# Patient Record
Sex: FEMALE | Race: WHITE | Employment: OTHER | URBAN - METROPOLITAN AREA
[De-identification: names, ages, dates, MRNs, and addresses within clinical notes are randomized per-mention and may not be internally consistent; named-entity substitution may affect disease eponyms.]

---

## 2019-05-20 ENCOUNTER — HOSPITAL ENCOUNTER (OUTPATIENT)
Age: 84
Setting detail: OBSERVATION
Discharge: HOME | End: 2019-05-21
Attending: EMERGENCY MEDICINE | Admitting: INTERNAL MEDICINE
Payer: MEDICARE

## 2019-05-20 ENCOUNTER — APPOINTMENT (OUTPATIENT)
Dept: GENERAL RADIOLOGY | Age: 84
End: 2019-05-20
Payer: MEDICARE

## 2019-05-20 DIAGNOSIS — R07.9 CHEST PAIN, UNSPECIFIED TYPE: Primary | ICD-10-CM

## 2019-05-20 LAB
ALBUMIN SERPL-MCNC: 4.1 G/DL (ref 3.5–5.2)
ALP BLD-CCNC: 35 U/L (ref 35–104)
ALT SERPL-CCNC: 13 U/L (ref 0–32)
ANION GAP SERPL CALCULATED.3IONS-SCNC: 9 MMOL/L (ref 7–16)
AST SERPL-CCNC: 14 U/L (ref 0–31)
BILIRUB SERPL-MCNC: 0.5 MG/DL (ref 0–1.2)
BUN BLDV-MCNC: 14 MG/DL (ref 8–23)
CALCIUM SERPL-MCNC: 8.9 MG/DL (ref 8.6–10.2)
CHLORIDE BLD-SCNC: 108 MMOL/L (ref 98–107)
CK MB: 2.5 NG/ML (ref 0–4.3)
CO2: 29 MMOL/L (ref 22–29)
CREAT SERPL-MCNC: 0.8 MG/DL (ref 0.5–1)
GFR AFRICAN AMERICAN: >60
GFR NON-AFRICAN AMERICAN: >60 ML/MIN/1.73
GLUCOSE BLD-MCNC: 108 MG/DL (ref 74–99)
HCT VFR BLD CALC: 37.6 % (ref 34–48)
HEMOGLOBIN: 12.5 G/DL (ref 11.5–15.5)
MCH RBC QN AUTO: 31.2 PG (ref 26–35)
MCHC RBC AUTO-ENTMCNC: 33.2 % (ref 32–34.5)
MCV RBC AUTO: 93.8 FL (ref 80–99.9)
PDW BLD-RTO: 12.5 FL (ref 11.5–15)
PLATELET # BLD: 173 E9/L (ref 130–450)
PMV BLD AUTO: 9.8 FL (ref 7–12)
POTASSIUM SERPL-SCNC: 3.8 MMOL/L (ref 3.5–5)
RBC # BLD: 4.01 E12/L (ref 3.5–5.5)
SODIUM BLD-SCNC: 146 MMOL/L (ref 132–146)
TOTAL CK: 75 U/L (ref 20–180)
TOTAL PROTEIN: 6.3 G/DL (ref 6.4–8.3)
TROPONIN: <0.01 NG/ML (ref 0–0.03)
TROPONIN: <0.01 NG/ML (ref 0–0.03)
WBC # BLD: 6.1 E9/L (ref 4.5–11.5)

## 2019-05-20 PROCEDURE — 96372 THER/PROPH/DIAG INJ SC/IM: CPT

## 2019-05-20 PROCEDURE — 80053 COMPREHEN METABOLIC PANEL: CPT

## 2019-05-20 PROCEDURE — 36415 COLL VENOUS BLD VENIPUNCTURE: CPT

## 2019-05-20 PROCEDURE — G0378 HOSPITAL OBSERVATION PER HR: HCPCS

## 2019-05-20 PROCEDURE — 85027 COMPLETE CBC AUTOMATED: CPT

## 2019-05-20 PROCEDURE — 6370000000 HC RX 637 (ALT 250 FOR IP): Performed by: INTERNAL MEDICINE

## 2019-05-20 PROCEDURE — 71045 X-RAY EXAM CHEST 1 VIEW: CPT

## 2019-05-20 PROCEDURE — 6360000002 HC RX W HCPCS: Performed by: INTERNAL MEDICINE

## 2019-05-20 PROCEDURE — 99285 EMERGENCY DEPT VISIT HI MDM: CPT

## 2019-05-20 PROCEDURE — 93005 ELECTROCARDIOGRAM TRACING: CPT | Performed by: NURSE PRACTITIONER

## 2019-05-20 PROCEDURE — 82550 ASSAY OF CK (CPK): CPT

## 2019-05-20 PROCEDURE — 2580000003 HC RX 258: Performed by: INTERNAL MEDICINE

## 2019-05-20 PROCEDURE — 93010 ELECTROCARDIOGRAM REPORT: CPT | Performed by: INTERNAL MEDICINE

## 2019-05-20 PROCEDURE — 84484 ASSAY OF TROPONIN QUANT: CPT

## 2019-05-20 PROCEDURE — 82553 CREATINE MB FRACTION: CPT

## 2019-05-20 RX ORDER — ASPIRIN 81 MG/1
81 TABLET, CHEWABLE ORAL DAILY
Status: DISCONTINUED | OUTPATIENT
Start: 2019-05-21 | End: 2019-05-21 | Stop reason: HOSPADM

## 2019-05-20 RX ORDER — PANTOPRAZOLE SODIUM 40 MG/1
40 TABLET, DELAYED RELEASE ORAL DAILY
Status: DISCONTINUED | OUTPATIENT
Start: 2019-05-20 | End: 2019-05-21 | Stop reason: HOSPADM

## 2019-05-20 RX ORDER — GUAIFENESIN AND DEXTROMETHORPHAN HYDROBROMIDE 1200; 60 MG/1; MG/1
1 TABLET, EXTENDED RELEASE ORAL 2 TIMES DAILY
Status: DISCONTINUED | OUTPATIENT
Start: 2019-05-20 | End: 2019-05-20 | Stop reason: CLARIF

## 2019-05-20 RX ORDER — EZETIMIBE 10 MG/1
10 TABLET ORAL DAILY
Status: DISCONTINUED | OUTPATIENT
Start: 2019-05-20 | End: 2019-05-20 | Stop reason: CLARIF

## 2019-05-20 RX ORDER — SODIUM CHLORIDE 0.9 % (FLUSH) 0.9 %
10 SYRINGE (ML) INJECTION PRN
Status: DISCONTINUED | OUTPATIENT
Start: 2019-05-20 | End: 2019-05-21 | Stop reason: HOSPADM

## 2019-05-20 RX ORDER — NITROGLYCERIN 0.4 MG/1
0.4 TABLET SUBLINGUAL EVERY 5 MIN PRN
Status: DISCONTINUED | OUTPATIENT
Start: 2019-05-20 | End: 2019-05-21 | Stop reason: HOSPADM

## 2019-05-20 RX ORDER — LOSARTAN POTASSIUM 25 MG/1
25 TABLET ORAL DAILY
Status: DISCONTINUED | OUTPATIENT
Start: 2019-05-20 | End: 2019-05-21 | Stop reason: HOSPADM

## 2019-05-20 RX ORDER — ATORVASTATIN CALCIUM 10 MG/1
20 TABLET, FILM COATED ORAL DAILY
Status: DISCONTINUED | OUTPATIENT
Start: 2019-05-20 | End: 2019-05-21 | Stop reason: HOSPADM

## 2019-05-20 RX ORDER — ONDANSETRON 2 MG/ML
4 INJECTION INTRAMUSCULAR; INTRAVENOUS EVERY 6 HOURS PRN
Status: DISCONTINUED | OUTPATIENT
Start: 2019-05-20 | End: 2019-05-21 | Stop reason: HOSPADM

## 2019-05-20 RX ORDER — HYDROCHLOROTHIAZIDE 12.5 MG/1
12.5 TABLET ORAL DAILY
Status: DISCONTINUED | OUTPATIENT
Start: 2019-05-20 | End: 2019-05-21 | Stop reason: HOSPADM

## 2019-05-20 RX ORDER — AMLODIPINE BESYLATE 2.5 MG/1
2.5 TABLET ORAL DAILY
Status: DISCONTINUED | OUTPATIENT
Start: 2019-05-20 | End: 2019-05-21 | Stop reason: HOSPADM

## 2019-05-20 RX ORDER — SODIUM CHLORIDE 0.9 % (FLUSH) 0.9 %
10 SYRINGE (ML) INJECTION EVERY 12 HOURS SCHEDULED
Status: DISCONTINUED | OUTPATIENT
Start: 2019-05-20 | End: 2019-05-21 | Stop reason: HOSPADM

## 2019-05-20 RX ORDER — GUAIFENESIN AND DEXTROMETHORPHAN HYDROBROMIDE 400; 20 MG/1; MG/1
1 TABLET ORAL EVERY 4 HOURS PRN
Status: DISCONTINUED | OUTPATIENT
Start: 2019-05-20 | End: 2019-05-21 | Stop reason: HOSPADM

## 2019-05-20 RX ADMIN — ENOXAPARIN SODIUM 40 MG: 40 INJECTION SUBCUTANEOUS at 20:48

## 2019-05-20 RX ADMIN — PANTOPRAZOLE SODIUM 40 MG: 40 TABLET, DELAYED RELEASE ORAL at 18:20

## 2019-05-20 RX ADMIN — LOSARTAN POTASSIUM 25 MG: 25 TABLET, FILM COATED ORAL at 18:21

## 2019-05-20 RX ADMIN — Medication 10 ML: at 20:48

## 2019-05-20 RX ADMIN — HYDROCHLOROTHIAZIDE 12.5 MG: 12.5 TABLET ORAL at 18:21

## 2019-05-20 RX ADMIN — ATORVASTATIN CALCIUM 20 MG: 10 TABLET, FILM COATED ORAL at 18:20

## 2019-05-20 RX ADMIN — AMLODIPINE BESYLATE 2.5 MG: 2.5 TABLET ORAL at 18:21

## 2019-05-20 ASSESSMENT — HEART SCORE: ECG: 0

## 2019-05-20 NOTE — ED NOTES
ED SBAR faxed to . This nurse spoke with Adithya Valencia [place] [address] at [address]. Confirmation of ED SBAR receipt and pending transportation request is verbalized. Patient transportation to room 8207A requested.      Yonatan Ruiz RN  05/20/19 9513

## 2019-05-20 NOTE — ED NOTES
Bed: 40  Expected date:   Expected time:   Means of arrival:   Comments:  Toñito Hollingsworth, HARRIS  05/20/19 [id number]

## 2019-05-20 NOTE — ED PROVIDER NOTES
ED Attending  CC: Paige     Department of Emergency Medicine   ED  Provider Note  Admit Date/RoomTime: 5/20/2019 12:12 PM  ED Room: 40/40   Chief Complaint     Chest Pain (chest pain with sudden onset left sided radiating into left hand and back-dull. was given 1 ntg sl and 4 baby aspirin. )    History of Present Illness   Source of history provided by:  patient. History/Exam Limitations: none. Alejandra Colin is a 80 y.o. old female who has a past medical history of:   Past Medical History:   Diagnosis Date    GERD (gastroesophageal reflux disease)     Hyperlipidemia     Hypertension     presents to the emergency department by ambulance where the patient received see Ambulance Run Sheet, 324mg aspirin, 1SL nitro prior to arrival., with complaints of sudden onset left chest discomfort described as sharp beginning a few hour(s) prior to arrival.  The pain radiates to the left arm. Duration of symptoms: a few hours. Symptom(s) now is none. Her symptoms are associated with nothing. The symptoms are worsened by walking and relieved by nothing, she reports her pain had resolved by the time EMS gave nitro. There has been No shortness of breath, No orthopnea, No edema, No palpitations and No syncope associated with complaint. Her cardiac risk factors are heart score 3. ROS    Pertinent positives and negatives are stated within HPI, all other systems reviewed and are negative. Past Surgical History:   Procedure Laterality Date    APPENDECTOMY      CHOLECYSTECTOMY      COLONOSCOPY  9/12/13    HYSTERECTOMY     Social History:  reports that she has never smoked. She has never used smokeless tobacco. She reports that she drinks alcohol. She reports that she does not use drugs. Family History: family history is not on file. Allergies: Patient has no known allergies.     Physical Exam           ED Triage Vitals [05/20/19 1215]   BP Temp Temp Source Pulse Resp SpO2 Height Weight   (!) 157/64 98.2 °F None    Procedures:   none    MDM:  Patient is well appearing, afebrile. VS stable. Labs obtained, reassuring. No pain throughout ED stay. Plan is for admission for chest pain observation, case discussed with Dr. Saran Carlos agreeable to admit. Counseling:   I have spoken with the patient and discussed todays results, in addition to providing specific details for the plan of care and counseling regarding the diagnosis and prognosis and are agreeable with the plan. Assessment      1. Chest pain, unspecified type      This patient's ED course included: a personal history and physicial examination, re-evaluation prior to disposition, cardiac monitoring and continuous pulse oximetry  This patient has remained hemodynamically stable during their ED course. Plan   Admit to telemetry. Patient condition is good. New Medications     New Prescriptions    No medications on file     Electronically signed by LORNE Ramirez CNP   DD: 5/20/19  **This report was transcribed using voice recognition software. Every effort was made to ensure accuracy; however, inadvertent computerized transcription errors may be present.   END OF PROVIDER NOTE      LORNE Cobian CNP  05/20/19 7935

## 2019-05-21 ENCOUNTER — APPOINTMENT (OUTPATIENT)
Dept: NON INVASIVE DIAGNOSTICS | Age: 84
End: 2019-05-21
Payer: MEDICARE

## 2019-05-21 ENCOUNTER — APPOINTMENT (OUTPATIENT)
Dept: NUCLEAR MEDICINE | Age: 84
End: 2019-05-21
Payer: MEDICARE

## 2019-05-21 VITALS
DIASTOLIC BLOOD PRESSURE: 66 MMHG | SYSTOLIC BLOOD PRESSURE: 145 MMHG | HEIGHT: 63 IN | RESPIRATION RATE: 16 BRPM | HEART RATE: 80 BPM | TEMPERATURE: 97.6 F | OXYGEN SATURATION: 93 % | BODY MASS INDEX: 21.26 KG/M2 | WEIGHT: 120 LBS

## 2019-05-21 PROBLEM — I10 ESSENTIAL HYPERTENSION: Chronic | Status: ACTIVE | Noted: 2019-05-21

## 2019-05-21 PROBLEM — E78.5 HYPERLIPIDEMIA LDL GOAL <100: Chronic | Status: ACTIVE | Noted: 2019-05-21

## 2019-05-21 LAB
CHOLESTEROL, TOTAL: 171 MG/DL (ref 0–199)
CK MB: 2 NG/ML (ref 0–4.3)
EKG ATRIAL RATE: 65 BPM
EKG P AXIS: 59 DEGREES
EKG P-R INTERVAL: 156 MS
EKG Q-T INTERVAL: 396 MS
EKG QRS DURATION: 84 MS
EKG QTC CALCULATION (BAZETT): 411 MS
EKG R AXIS: 16 DEGREES
EKG T AXIS: 53 DEGREES
EKG VENTRICULAR RATE: 65 BPM
HCT VFR BLD CALC: 40.4 % (ref 34–48)
HDLC SERPL-MCNC: 66 MG/DL
HEMOGLOBIN: 13.8 G/DL (ref 11.5–15.5)
LDL CHOLESTEROL CALCULATED: 88 MG/DL (ref 0–99)
LV EF: 67 %
LVEF MODALITY: NORMAL
MCH RBC QN AUTO: 31.7 PG (ref 26–35)
MCHC RBC AUTO-ENTMCNC: 34.2 % (ref 32–34.5)
MCV RBC AUTO: 92.9 FL (ref 80–99.9)
PDW BLD-RTO: 12.6 FL (ref 11.5–15)
PLATELET # BLD: 174 E9/L (ref 130–450)
PMV BLD AUTO: 9.4 FL (ref 7–12)
RBC # BLD: 4.35 E12/L (ref 3.5–5.5)
TOTAL CK: 48 U/L (ref 20–180)
TRIGL SERPL-MCNC: 83 MG/DL (ref 0–149)
TROPONIN: <0.01 NG/ML (ref 0–0.03)
VLDLC SERPL CALC-MCNC: 17 MG/DL
WBC # BLD: 5.5 E9/L (ref 4.5–11.5)

## 2019-05-21 PROCEDURE — 6360000002 HC RX W HCPCS: Performed by: INTERNAL MEDICINE

## 2019-05-21 PROCEDURE — 93017 CV STRESS TEST TRACING ONLY: CPT

## 2019-05-21 PROCEDURE — G0378 HOSPITAL OBSERVATION PER HR: HCPCS

## 2019-05-21 PROCEDURE — 3430000000 HC RX DIAGNOSTIC RADIOPHARMACEUTICAL: Performed by: RADIOLOGY

## 2019-05-21 PROCEDURE — 84484 ASSAY OF TROPONIN QUANT: CPT

## 2019-05-21 PROCEDURE — 96372 THER/PROPH/DIAG INJ SC/IM: CPT

## 2019-05-21 PROCEDURE — 6370000000 HC RX 637 (ALT 250 FOR IP): Performed by: INTERNAL MEDICINE

## 2019-05-21 PROCEDURE — 82553 CREATINE MB FRACTION: CPT

## 2019-05-21 PROCEDURE — 82550 ASSAY OF CK (CPK): CPT

## 2019-05-21 PROCEDURE — 2580000003 HC RX 258: Performed by: INTERNAL MEDICINE

## 2019-05-21 PROCEDURE — 78452 HT MUSCLE IMAGE SPECT MULT: CPT

## 2019-05-21 PROCEDURE — 85027 COMPLETE CBC AUTOMATED: CPT

## 2019-05-21 PROCEDURE — A9500 TC99M SESTAMIBI: HCPCS | Performed by: RADIOLOGY

## 2019-05-21 PROCEDURE — 36415 COLL VENOUS BLD VENIPUNCTURE: CPT

## 2019-05-21 PROCEDURE — 80061 LIPID PANEL: CPT

## 2019-05-21 RX ADMIN — ASPIRIN 81 MG 81 MG: 81 TABLET ORAL at 08:36

## 2019-05-21 RX ADMIN — ATORVASTATIN CALCIUM 20 MG: 10 TABLET, FILM COATED ORAL at 08:36

## 2019-05-21 RX ADMIN — PANTOPRAZOLE SODIUM 40 MG: 40 TABLET, DELAYED RELEASE ORAL at 08:36

## 2019-05-21 RX ADMIN — Medication 11 MILLICURIE: at 11:15

## 2019-05-21 RX ADMIN — Medication 33 MILLICURIE: at 13:36

## 2019-05-21 RX ADMIN — REGADENOSON 0.4 MG: 0.08 INJECTION, SOLUTION INTRAVENOUS at 13:33

## 2019-05-21 RX ADMIN — LOSARTAN POTASSIUM 25 MG: 25 TABLET, FILM COATED ORAL at 08:36

## 2019-05-21 RX ADMIN — ENOXAPARIN SODIUM 40 MG: 40 INJECTION SUBCUTANEOUS at 08:36

## 2019-05-21 RX ADMIN — Medication 10 ML: at 08:37

## 2019-05-21 RX ADMIN — HYDROCHLOROTHIAZIDE 12.5 MG: 12.5 TABLET ORAL at 08:36

## 2019-05-21 RX ADMIN — AMLODIPINE BESYLATE 2.5 MG: 2.5 TABLET ORAL at 08:36

## 2019-05-21 NOTE — CARE COORDINATION
Met with patient in the room regarding transition of care at discharge. Patient is alert and oriented. She is independent. She drives. She does not have a history of inpatient rehab or home care. She is not interested in home care. Her pcp is Dr Aj Cross and her pharmacy is [address] [place] [address]. Her  will transport her home at discharge. Will follow prn.

## 2019-05-21 NOTE — PROGRESS NOTES
Dr Sophia Erwin given the stress test results, he said patient can be discharged. Notified Dr Michael Cadet of stress test results.

## 2019-05-21 NOTE — H&P
[address] [facility]  Attending History and Physical      CHIEF COMPLAINT:  Chest pain      HISTORY OF PRESENT ILLNESS:      The patient is a 80 y.o. female patient of dr Александр Dickson who presents with complains of chest pain. The day of presentation the patient developed pain in the middle of her chest.  She denies shortness of breath, abdominal pain, nausea, vomiting, fevers, chills and diaphoresis. She did not take anything for the pain. The symptoms went away on their own. She denied exertion as an exacerbating factor. She denied relieving or exacerbating measures. She is symptom free at this time. Past Medical History:    Past Medical History:   Diagnosis Date    GERD (gastroesophageal reflux disease)     Hyperlipidemia     Hypertension        Past Surgical History:    Past Surgical History:   Procedure Laterality Date    APPENDECTOMY      CHOLECYSTECTOMY      COLONOSCOPY  9/12/13    HYSTERECTOMY         Medications Prior to Admission:    Medications Prior to Admission: hydrochlorothiazide (HYDRODIURIL) 25 MG tablet, Take 12.5 mg by mouth daily. Takes in evening  olmesartan (BENICAR) 40 MG tablet, Take 40 mg by mouth daily. Takes in evening  Atorvastatin Calcium (LIPITOR PO), Take 20 mg by mouth daily. Takes in evening  Ezetimibe (ZETIA PO), Take 10 mg by mouth daily. Takes in the evening  Esomeprazole Magnesium (NEXIUM PO), Take 40 mg by mouth daily. AMLODIPINE BESYLATE PO, Take 2.5 mg by mouth daily. Takes in evening  Dextromethorphan-Guaifenesin (MUCINEX DM MAXIMUM STRENGTH)  MG TB12, Take 1 tablet by mouth 2 times daily    Allergies:    Patient has no known allergies. Social History:    reports that she has never smoked. She has never used smokeless tobacco. She reports that she drinks alcohol. She reports that she does not use drugs. Family History:   family history is not on file.     REVIEW OF SYSTEMS:  As above in the HPI, otherwise negative    PHYSICAL MG  Phosphorus:  No results found for: PHOS  PT/INR:  No results found for: PROTIME, INR  PTT:  No results found for: APTT, PTT[APTT}  Troponin:    Lab Results   Component Value Date    TROPONINI <0.01 05/21/2019     Last 3 Troponin:    Lab Results   Component Value Date    TROPONINI <0.01 05/21/2019    TROPONINI <0.01 05/20/2019    TROPONINI <0.01 05/20/2019     U/A:  No results found for: NITRITE, COLORU, PROTEINU, PHUR, LABCAST, WBCUA, RBCUA, MUCUS, TRICHOMONAS, YEAST, BACTERIA, CLARITYU, SPECGRAV, LEUKOCYTESUR, UROBILINOGEN, BILIRUBINUR, BLOODU, GLUCOSEU, AMORPHOUS  HgBA1c:  No results found for: LABA1C  FLP:    Lab Results   Component Value Date    TRIG 83 05/21/2019    HDL 66 05/21/2019    LDLCALC 88 05/21/2019    LABVLDL 17 05/21/2019     TSH:  No results found for: TSH    ASSESSMENT:      Patient Active Problem List   Diagnosis    Chest pain    Essential hypertension    Hyperlipidemia LDL goal <100         PLAN:    Rule out ACS. EKG and enzymes negative. Atypical symptoms. Will stress. Blood pressure ok, continue current medications  Continue aggressive lipid therapy  Discharge if stress is negative.     Natasha Ordoñez MD  9:20 AM  5/21/2019

## 2020-05-24 ENCOUNTER — APPOINTMENT (OUTPATIENT)
Dept: CT IMAGING | Age: 85
End: 2020-05-24
Payer: MEDICARE

## 2020-05-24 ENCOUNTER — APPOINTMENT (OUTPATIENT)
Dept: GENERAL RADIOLOGY | Age: 85
End: 2020-05-24
Payer: MEDICARE

## 2020-05-24 ENCOUNTER — HOSPITAL ENCOUNTER (EMERGENCY)
Age: 85
Discharge: HOME | End: 2020-05-24
Attending: EMERGENCY MEDICINE
Payer: MEDICARE

## 2020-05-24 VITALS
DIASTOLIC BLOOD PRESSURE: 73 MMHG | OXYGEN SATURATION: 93 % | HEART RATE: 84 BPM | BODY MASS INDEX: 21.26 KG/M2 | TEMPERATURE: 97.6 F | RESPIRATION RATE: 16 BRPM | WEIGHT: 120 LBS | SYSTOLIC BLOOD PRESSURE: 192 MMHG

## 2020-05-24 PROCEDURE — 73562 X-RAY EXAM OF KNEE 3: CPT

## 2020-05-24 PROCEDURE — 90471 IMMUNIZATION ADMIN: CPT | Performed by: EMERGENCY MEDICINE

## 2020-05-24 PROCEDURE — 6360000002 HC RX W HCPCS: Performed by: EMERGENCY MEDICINE

## 2020-05-24 PROCEDURE — 70486 CT MAXILLOFACIAL W/O DYE: CPT

## 2020-05-24 PROCEDURE — 70450 CT HEAD/BRAIN W/O DYE: CPT

## 2020-05-24 PROCEDURE — 99284 EMERGENCY DEPT VISIT MOD MDM: CPT

## 2020-05-24 PROCEDURE — 72125 CT NECK SPINE W/O DYE: CPT

## 2020-05-24 PROCEDURE — 90715 TDAP VACCINE 7 YRS/> IM: CPT | Performed by: EMERGENCY MEDICINE

## 2020-05-24 PROCEDURE — 6370000000 HC RX 637 (ALT 250 FOR IP): Performed by: EMERGENCY MEDICINE

## 2020-05-24 PROCEDURE — 73130 X-RAY EXAM OF HAND: CPT

## 2020-05-24 RX ORDER — IBUPROFEN 800 MG/1
800 TABLET ORAL 2 TIMES DAILY PRN
Qty: 60 TABLET | Refills: 0 | Status: SHIPPED | OUTPATIENT
Start: 2020-05-24 | End: 2021-02-01

## 2020-05-24 RX ORDER — DIAPER,BRIEF,INFANT-TODD,DISP
EACH MISCELLANEOUS ONCE
Status: COMPLETED | OUTPATIENT
Start: 2020-05-24 | End: 2020-05-24

## 2020-05-24 RX ADMIN — TETANUS TOXOID, REDUCED DIPHTHERIA TOXOID AND ACELLULAR PERTUSSIS VACCINE, ADSORBED 0.5 ML: 5; 2.5; 8; 8; 2.5 SUSPENSION INTRAMUSCULAR at 20:35

## 2020-05-24 RX ADMIN — BACITRACIN ZINC: 500 OINTMENT TOPICAL at 20:38

## 2020-05-24 ASSESSMENT — PAIN SCALES - GENERAL: PAINLEVEL_OUTOF10: 2

## 2020-05-24 ASSESSMENT — PAIN DESCRIPTION - PAIN TYPE: TYPE: ACUTE PAIN

## 2020-05-24 ASSESSMENT — PAIN DESCRIPTION - LOCATION: LOCATION: HEAD

## 2020-05-25 ASSESSMENT — ENCOUNTER SYMPTOMS
WHEEZING: 0
VOMITING: 0
ABDOMINAL DISTENTION: 0
EYE REDNESS: 0
EYE PAIN: 0
EYE DISCHARGE: 0
SHORTNESS OF BREATH: 0
BACK PAIN: 0
COUGH: 0
SINUS PRESSURE: 0
NAUSEA: 0
DIARRHEA: 0
SORE THROAT: 0

## 2020-05-25 NOTE — ED NOTES
Wound care provided to patient. Ice provided to right hand. Pt forgetful at this time. Dr Darshan Rosenthal notified. Pt updated on imaging results and plan of care.       Radha Tai RN  05/24/20 6975

## 2020-05-25 NOTE — ED NOTES
Discharge instructions also reviewed with pts . advised to monitor for concussion symptoms and to take her night dose of blood pressure medicine.       Pebbles Enamorado RN  05/24/20 6767

## 2020-05-25 NOTE — ED PROVIDER NOTES
Patient is a 81yo female with pmhx of htn, hld presenting to the ED for fall. Symptoms moderate in severity and happening just prior to arrival. Patient was taking dog to the vet when the dog got lose and she was chasing him. He ran up steps and when she went to pick him up off of the steps she tripped and fell forward hitting her face on the steps. Deneis losing consciousness, no blood thinners. Did supper a nose bleed which has stopped. Right knee and first hand pain since the indicint. Denies any numbness/tinging/blurry vision, changes in vision. Did not feel lightheaded, takes multiple times she tripped, no syncope. Nothingmakes symptoms better, palpation makes symptoms worse. Review of Systems   Constitutional: Negative for chills and fever. HENT: Positive for nosebleeds. Negative for ear pain, sinus pressure and sore throat. Eyes: Negative for pain, discharge and redness. Respiratory: Negative for cough, shortness of breath and wheezing. Cardiovascular: Negative for chest pain. Gastrointestinal: Negative for abdominal distention, diarrhea, nausea and vomiting. Genitourinary: Negative for dysuria and frequency. Musculoskeletal: Positive for arthralgias. Negative for back pain. Skin: Positive for wound. Negative for rash. Neurological: Positive for headaches. Negative for weakness. Hematological: Negative for adenopathy. All other systems reviewed and are negative. Physical Exam  Vitals signs and nursing note reviewed. Constitutional:       General: She is not in acute distress. Appearance: She is well-developed. She is not ill-appearing. HENT:      Head: Normocephalic. Comments: Hematoma to the right side of forehead. No evidence of depressed skull. No TTP across the face, no maxillay/manidbular/nasal or orbital tenderness. Eccymosis to the nasal bridge     Nose:      Comments: No septal hematoma, septum midline.  Dried blood to the bilateral nares  Eyes: and supportive care at home. Strict return precautions were discussed. Patient blood pressure controlled throughout the stay and elevated on reading prior to discharge. Patient has not taken night time bp medications and discussed with patient and will take her medications when she gets home. She verbalized understanding and agreeable with the plan. All questions answered and patient was discharged. --------------------------------------------- PAST HISTORY ---------------------------------------------  Past Medical History:  has a past medical history of GERD (gastroesophageal reflux disease), Hyperlipidemia, and Hypertension. Past Surgical History:  has a past surgical history that includes Cholecystectomy; Appendectomy; Hysterectomy; and Colonoscopy (9/12/13). Social History:  reports that she has never smoked. She has never used smokeless tobacco. She reports current alcohol use. She reports that she does not use drugs. Family History: family history is not on file. The patients home medications have been reviewed. Allergies: Patient has no known allergies. -------------------------------------------------- RESULTS -------------------------------------------------  Labs:  No results found for this visit on 05/24/20. Radiology:  XR KNEE RIGHT (3 VIEWS)   Final Result   No acute fractures or dislocations in the right knee. Degenerative changes mainly in the medial compartment of the knee   joint. XR HAND RIGHT (MIN 3 VIEWS)   Final Result   Ring metallic artifacts obscures detail in the mid shaft of the   proximal phalanx of the fourth finger. Otherwise no acute fractures or dislocations in the right hand. CT Head WO Contrast   Final Result   1. No indication for an acute intracranial process. 2. Contusion of the anterior frontal scalp area. CT FACIAL BONES WO CONTRAST   Final Result   1.  Comminuted the depressed fracture of the nasal bone across the

## 2020-11-04 ENCOUNTER — PROCEDURE VISIT (OUTPATIENT)
Dept: AUDIOLOGY | Age: 85
End: 2020-11-04
Payer: MEDICARE

## 2020-11-04 PROCEDURE — 92557 COMPREHENSIVE HEARING TEST: CPT | Performed by: AUDIOLOGIST

## 2020-11-04 PROCEDURE — 92567 TYMPANOMETRY: CPT | Performed by: AUDIOLOGIST

## 2020-11-05 NOTE — PROGRESS NOTES
This patient was seen for audiometric/tympanometric testing, due to a longstanding bilateral decrease in hearing sensitivity. Patient denied earaches, tinnitus and vertigo. Patient is experiencing an increase in difficulty understanding conversation, especially in noisy situations. Patient is interested in obtaining hearing aids, if warranted. Audiometry revealed a mild-to-severe sensorineural hearing loss, through the frequency range, bilaterally. Reliability was good. Speech reception thresholds were in good agreement with the pure tone averages, bilaterally. Speech discrimination scores were 84%, right ear and 96%, left ear at 60-65dBHL. Tympanometry revealed normal middle ear peak pressure and compliance, bilaterally. Ipsilateral acoustic reflexes were absent, right ear and present, left ear at 1000Hz. The results were reviewed with the patient. The benefits and limitations of amplification were discussed with the patient. It was recommended that she be fit with binaural hearing aids. Her insurance benefits will be reviewed and patient will be notified of insurance information.         Jaime Patricio, [facility]

## 2020-11-19 ENCOUNTER — PROCEDURE VISIT (OUTPATIENT)
Dept: AUDIOLOGY | Age: 85
End: 2020-11-19

## 2020-11-19 PROCEDURE — 99999 PR OFFICE/OUTPT VISIT,PROCEDURE ONLY: CPT | Performed by: AUDIOLOGIST

## 2020-12-17 ENCOUNTER — PROCEDURE VISIT (OUTPATIENT)
Dept: AUDIOLOGY | Age: 85
End: 2020-12-17

## 2020-12-17 PROCEDURE — 99999 PR OFFICE/OUTPT VISIT,PROCEDURE ONLY: CPT | Performed by: AUDIOLOGIST

## 2020-12-17 NOTE — PROGRESS NOTES
Patient seen for hearing aid fitting. Patient was instructed in the use and care of the hearing aids and . Patient was able to insert and remove hearing aids, with minimal difficulty. Patient is scheduled to return on 1/15/2021 (out of town) for a hearing aid check.

## 2021-02-01 ENCOUNTER — APPOINTMENT (OUTPATIENT)
Dept: GENERAL RADIOLOGY | Age: 86
DRG: 177 | End: 2021-02-01
Payer: MEDICARE

## 2021-02-01 ENCOUNTER — APPOINTMENT (OUTPATIENT)
Dept: CT IMAGING | Age: 86
DRG: 177 | End: 2021-02-01
Payer: MEDICARE

## 2021-02-01 ENCOUNTER — HOSPITAL ENCOUNTER (INPATIENT)
Age: 86
LOS: 3 days | Discharge: HOME HEALTH | DRG: 177 | End: 2021-02-04
Attending: EMERGENCY MEDICINE | Admitting: INTERNAL MEDICINE
Payer: MEDICARE

## 2021-02-01 DIAGNOSIS — U07.1 COVID-19: ICD-10-CM

## 2021-02-01 DIAGNOSIS — E87.6 HYPOKALEMIA: ICD-10-CM

## 2021-02-01 DIAGNOSIS — J96.01 ACUTE RESPIRATORY FAILURE WITH HYPOXIA (HCC): Primary | ICD-10-CM

## 2021-02-01 DIAGNOSIS — R07.9 CHEST PAIN, UNSPECIFIED TYPE: ICD-10-CM

## 2021-02-01 PROBLEM — K21.9 GASTROESOPHAGEAL REFLUX DISEASE WITHOUT ESOPHAGITIS: Status: ACTIVE | Noted: 2021-02-01

## 2021-02-01 PROBLEM — J12.82 PNEUMONIA DUE TO COVID-19 VIRUS: Status: ACTIVE | Noted: 2021-02-01

## 2021-02-01 LAB
ALBUMIN SERPL-MCNC: 3.5 G/DL (ref 3.5–5.2)
ALP BLD-CCNC: 38 U/L (ref 35–104)
ALT SERPL-CCNC: 8 U/L (ref 0–32)
ANION GAP SERPL CALCULATED.3IONS-SCNC: 11 MMOL/L (ref 7–16)
ANISOCYTOSIS: ABNORMAL
AST SERPL-CCNC: 17 U/L (ref 0–31)
B.E.: 4.3 MMOL/L (ref -3–0)
BACTERIA: ABNORMAL /HPF
BASOPHILS ABSOLUTE: 0 E9/L (ref 0–0.2)
BASOPHILS RELATIVE PERCENT: 0.2 % (ref 0–2)
BILIRUB SERPL-MCNC: 0.5 MG/DL (ref 0–1.2)
BILIRUBIN URINE: NEGATIVE
BLOOD, URINE: NEGATIVE
BUN BLDV-MCNC: 12 MG/DL (ref 8–23)
BURR CELLS: ABNORMAL
C-REACTIVE PROTEIN: 4.2 MG/DL (ref 0–0.4)
CALCIUM SERPL-MCNC: 8.7 MG/DL (ref 8.6–10.2)
CHLORIDE BLD-SCNC: 105 MMOL/L (ref 98–107)
CLARITY: CLEAR
CO2: 25 MMOL/L (ref 22–29)
COLOR: YELLOW
CREAT SERPL-MCNC: 0.7 MG/DL (ref 0.5–1)
D DIMER: 809 NG/ML DDU
DELIVERY SYSTEMS: ABNORMAL
DEVICE: ABNORMAL
EKG ATRIAL RATE: 72 BPM
EKG P AXIS: 73 DEGREES
EKG P-R INTERVAL: 158 MS
EKG Q-T INTERVAL: 390 MS
EKG QRS DURATION: 86 MS
EKG QTC CALCULATION (BAZETT): 427 MS
EKG R AXIS: 41 DEGREES
EKG T AXIS: 73 DEGREES
EKG VENTRICULAR RATE: 72 BPM
EOSINOPHILS ABSOLUTE: 0 E9/L (ref 0.05–0.5)
EOSINOPHILS RELATIVE PERCENT: 0.2 % (ref 0–6)
EPITHELIAL CELLS, UA: ABNORMAL /HPF
FERRITIN: 333 NG/ML
FIBRINOGEN: >700 MG/DL (ref 225–540)
FIO2 ARTERIAL: 28
GFR AFRICAN AMERICAN: >60
GFR NON-AFRICAN AMERICAN: >60 ML/MIN/1.73
GLUCOSE BLD-MCNC: 133 MG/DL (ref 74–99)
GLUCOSE URINE: NEGATIVE MG/DL
HCO3 ARTERIAL: 27.4 MMOL/L (ref 22–26)
HCT (EST): 40 % (ref 34–48)
HCT VFR BLD CALC: 38.2 % (ref 34–48)
HEMOGLOBIN: 12.8 G/DL (ref 11.5–15.5)
HGB, (EST): 13.6 G/DL (ref 11.5–15.5)
KETONES, URINE: NEGATIVE MG/DL
LACTATE DEHYDROGENASE: 298 U/L (ref 135–214)
LACTIC ACID: 0.8 MMOL/L (ref 0.5–2.2)
LACTIC ACID: 1.2 MMOL/L (ref 0.5–2.2)
LEUKOCYTE ESTERASE, URINE: ABNORMAL
LYMPHOCYTES ABSOLUTE: 0.5 E9/L (ref 1.5–4)
LYMPHOCYTES RELATIVE PERCENT: 8.7 % (ref 20–42)
MAGNESIUM: 1.8 MG/DL (ref 1.6–2.6)
MCH RBC QN AUTO: 29.6 PG (ref 26–35)
MCHC RBC AUTO-ENTMCNC: 33.5 % (ref 32–34.5)
MCV RBC AUTO: 88.4 FL (ref 80–99.9)
MONOCYTES ABSOLUTE: 0.55 E9/L (ref 0.1–0.95)
MONOCYTES RELATIVE PERCENT: 9.6 % (ref 2–12)
NEUTROPHILS ABSOLUTE: 4.51 E9/L (ref 1.8–7.3)
NEUTROPHILS RELATIVE PERCENT: 81.7 % (ref 43–80)
NITRITE, URINE: NEGATIVE
O2 SATURATION: 97.3 % (ref 92–98.5)
OPERATOR ID: 1221
OVALOCYTES: ABNORMAL
PATIENT TEMP: 37
PCO2 (TEMP CORRECTED): 35.3 MMHG (ref 35–45)
PDW BLD-RTO: 12.5 FL (ref 11.5–15)
PH (TEMPERATURE CORRECTED): 7.5 (ref 7.35–7.45)
PH UA: 7.5 (ref 5–9)
PLATELET # BLD: 167 E9/L (ref 130–450)
PMV BLD AUTO: 9.9 FL (ref 7–12)
PO2 (TEMP CORRECTED): 85.2 MMHG (ref 60–80)
POIKILOCYTES: ABNORMAL
POLYCHROMASIA: ABNORMAL
POTASSIUM REFLEX MAGNESIUM: 2.8 MMOL/L (ref 3.5–5)
POTASSIUM SERPL-SCNC: 3.6 MMOL/L (ref 3.5–5)
PROTEIN UA: 30 MG/DL
RBC # BLD: 4.32 E12/L (ref 3.5–5.5)
RBC UA: ABNORMAL /HPF (ref 0–2)
SARS-COV-2, NAAT: DETECTED
SODIUM BLD-SCNC: 141 MMOL/L (ref 132–146)
SOURCE, BLOOD GAS: ABNORMAL
SPECIFIC GRAVITY UA: 1.01 (ref 1–1.03)
TOTAL CK: 26 U/L (ref 20–180)
TOTAL PROTEIN: 6.1 G/DL (ref 6.4–8.3)
TROPONIN: <0.01 NG/ML (ref 0–0.03)
TROPONIN: <0.01 NG/ML (ref 0–0.03)
UROBILINOGEN, URINE: 4 E.U./DL
WBC # BLD: 5.5 E9/L (ref 4.5–11.5)
WBC UA: ABNORMAL /HPF (ref 0–5)

## 2021-02-01 PROCEDURE — 82803 BLOOD GASES ANY COMBINATION: CPT

## 2021-02-01 PROCEDURE — 85025 COMPLETE CBC W/AUTO DIFF WBC: CPT

## 2021-02-01 PROCEDURE — 84484 ASSAY OF TROPONIN QUANT: CPT

## 2021-02-01 PROCEDURE — U0002 COVID-19 LAB TEST NON-CDC: HCPCS

## 2021-02-01 PROCEDURE — 84132 ASSAY OF SERUM POTASSIUM: CPT

## 2021-02-01 PROCEDURE — 85384 FIBRINOGEN ACTIVITY: CPT

## 2021-02-01 PROCEDURE — 82550 ASSAY OF CK (CPK): CPT

## 2021-02-01 PROCEDURE — 93005 ELECTROCARDIOGRAM TRACING: CPT | Performed by: PHYSICIAN ASSISTANT

## 2021-02-01 PROCEDURE — 71045 X-RAY EXAM CHEST 1 VIEW: CPT

## 2021-02-01 PROCEDURE — 6370000000 HC RX 637 (ALT 250 FOR IP): Performed by: STUDENT IN AN ORGANIZED HEALTH CARE EDUCATION/TRAINING PROGRAM

## 2021-02-01 PROCEDURE — 85378 FIBRIN DEGRADE SEMIQUANT: CPT

## 2021-02-01 PROCEDURE — 6370000000 HC RX 637 (ALT 250 FOR IP): Performed by: NURSE PRACTITIONER

## 2021-02-01 PROCEDURE — 82728 ASSAY OF FERRITIN: CPT

## 2021-02-01 PROCEDURE — 2140000000 HC CCU INTERMEDIATE R&B

## 2021-02-01 PROCEDURE — 6360000002 HC RX W HCPCS: Performed by: STUDENT IN AN ORGANIZED HEALTH CARE EDUCATION/TRAINING PROGRAM

## 2021-02-01 PROCEDURE — 83615 LACTATE (LD) (LDH) ENZYME: CPT

## 2021-02-01 PROCEDURE — 2500000003 HC RX 250 WO HCPCS: Performed by: NURSE PRACTITIONER

## 2021-02-01 PROCEDURE — 80053 COMPREHEN METABOLIC PANEL: CPT

## 2021-02-01 PROCEDURE — 6360000004 HC RX CONTRAST MEDICATION: Performed by: RADIOLOGY

## 2021-02-01 PROCEDURE — 36415 COLL VENOUS BLD VENIPUNCTURE: CPT

## 2021-02-01 PROCEDURE — 93010 ELECTROCARDIOGRAM REPORT: CPT | Performed by: INTERNAL MEDICINE

## 2021-02-01 PROCEDURE — 99284 EMERGENCY DEPT VISIT MOD MDM: CPT

## 2021-02-01 PROCEDURE — 86140 C-REACTIVE PROTEIN: CPT

## 2021-02-01 PROCEDURE — 6360000002 HC RX W HCPCS: Performed by: NURSE PRACTITIONER

## 2021-02-01 PROCEDURE — 70450 CT HEAD/BRAIN W/O DYE: CPT

## 2021-02-01 PROCEDURE — 83605 ASSAY OF LACTIC ACID: CPT

## 2021-02-01 PROCEDURE — 81001 URINALYSIS AUTO W/SCOPE: CPT

## 2021-02-01 PROCEDURE — 83735 ASSAY OF MAGNESIUM: CPT

## 2021-02-01 PROCEDURE — 71275 CT ANGIOGRAPHY CHEST: CPT

## 2021-02-01 PROCEDURE — 2580000003 HC RX 258: Performed by: NURSE PRACTITIONER

## 2021-02-01 PROCEDURE — 93005 ELECTROCARDIOGRAM TRACING: CPT | Performed by: EMERGENCY MEDICINE

## 2021-02-01 RX ORDER — DEXAMETHASONE 4 MG/1
6 TABLET ORAL ONCE
Status: COMPLETED | OUTPATIENT
Start: 2021-02-01 | End: 2021-02-01

## 2021-02-01 RX ORDER — AMLODIPINE BESYLATE 5 MG/1
5 TABLET ORAL DAILY
Status: DISCONTINUED | OUTPATIENT
Start: 2021-02-02 | End: 2021-02-04 | Stop reason: HOSPADM

## 2021-02-01 RX ORDER — PROMETHAZINE HYDROCHLORIDE 25 MG/1
12.5 TABLET ORAL EVERY 6 HOURS PRN
Status: DISCONTINUED | OUTPATIENT
Start: 2021-02-01 | End: 2021-02-04 | Stop reason: HOSPADM

## 2021-02-01 RX ORDER — ALBUTEROL SULFATE 90 UG/1
2 AEROSOL, METERED RESPIRATORY (INHALATION) EVERY 6 HOURS
Status: DISCONTINUED | OUTPATIENT
Start: 2021-02-01 | End: 2021-02-04 | Stop reason: HOSPADM

## 2021-02-01 RX ORDER — ONDANSETRON 2 MG/ML
4 INJECTION INTRAMUSCULAR; INTRAVENOUS EVERY 6 HOURS PRN
Status: DISCONTINUED | OUTPATIENT
Start: 2021-02-01 | End: 2021-02-04 | Stop reason: HOSPADM

## 2021-02-01 RX ORDER — ZINC SULFATE 50(220)MG
50 CAPSULE ORAL DAILY
Status: DISCONTINUED | OUTPATIENT
Start: 2021-02-01 | End: 2021-02-04 | Stop reason: HOSPADM

## 2021-02-01 RX ORDER — NITROGLYCERIN 0.4 MG/1
0.4 TABLET SUBLINGUAL EVERY 5 MIN PRN
Status: DISCONTINUED | OUTPATIENT
Start: 2021-02-01 | End: 2021-02-04 | Stop reason: HOSPADM

## 2021-02-01 RX ORDER — ACETAMINOPHEN 325 MG/1
650 TABLET ORAL EVERY 6 HOURS PRN
Status: DISCONTINUED | OUTPATIENT
Start: 2021-02-01 | End: 2021-02-04 | Stop reason: HOSPADM

## 2021-02-01 RX ORDER — SODIUM CHLORIDE 0.9 % (FLUSH) 0.9 %
10 SYRINGE (ML) INJECTION EVERY 12 HOURS SCHEDULED
Status: DISCONTINUED | OUTPATIENT
Start: 2021-02-01 | End: 2021-02-04 | Stop reason: HOSPADM

## 2021-02-01 RX ORDER — ASCORBIC ACID 500 MG
500 TABLET ORAL DAILY
Status: DISCONTINUED | OUTPATIENT
Start: 2021-02-01 | End: 2021-02-04 | Stop reason: HOSPADM

## 2021-02-01 RX ORDER — ATORVASTATIN CALCIUM 20 MG/1
20 TABLET, FILM COATED ORAL NIGHTLY
Status: DISCONTINUED | OUTPATIENT
Start: 2021-02-01 | End: 2021-02-04 | Stop reason: HOSPADM

## 2021-02-01 RX ORDER — BUSPIRONE HYDROCHLORIDE 10 MG/1
10 TABLET ORAL DAILY PRN
Status: DISCONTINUED | OUTPATIENT
Start: 2021-02-01 | End: 2021-02-04 | Stop reason: HOSPADM

## 2021-02-01 RX ORDER — HYDRALAZINE HYDROCHLORIDE 20 MG/ML
10 INJECTION INTRAMUSCULAR; INTRAVENOUS EVERY 6 HOURS PRN
Status: DISCONTINUED | OUTPATIENT
Start: 2021-02-01 | End: 2021-02-04 | Stop reason: HOSPADM

## 2021-02-01 RX ORDER — POLYETHYLENE GLYCOL 3350 17 G/17G
17 POWDER, FOR SOLUTION ORAL DAILY PRN
Status: DISCONTINUED | OUTPATIENT
Start: 2021-02-01 | End: 2021-02-04 | Stop reason: HOSPADM

## 2021-02-01 RX ORDER — PREDNISONE 10 MG/1
10 TABLET ORAL DAILY
Status: ON HOLD | COMMUNITY
Start: 2021-01-29 | End: 2021-02-04 | Stop reason: HOSPADM

## 2021-02-01 RX ORDER — 0.9 % SODIUM CHLORIDE 0.9 %
30 INTRAVENOUS SOLUTION INTRAVENOUS PRN
Status: DISCONTINUED | OUTPATIENT
Start: 2021-02-01 | End: 2021-02-04 | Stop reason: HOSPADM

## 2021-02-01 RX ORDER — ASPIRIN 81 MG/1
324 TABLET, CHEWABLE ORAL ONCE
Status: COMPLETED | OUTPATIENT
Start: 2021-02-01 | End: 2021-02-01

## 2021-02-01 RX ORDER — BENZONATATE 100 MG/1
100 CAPSULE ORAL EVERY 8 HOURS PRN
Status: DISCONTINUED | OUTPATIENT
Start: 2021-02-01 | End: 2021-02-04 | Stop reason: HOSPADM

## 2021-02-01 RX ORDER — POTASSIUM CHLORIDE 20 MEQ/1
40 TABLET, EXTENDED RELEASE ORAL ONCE
Status: COMPLETED | OUTPATIENT
Start: 2021-02-01 | End: 2021-02-01

## 2021-02-01 RX ORDER — PANTOPRAZOLE SODIUM 40 MG/1
40 TABLET, DELAYED RELEASE ORAL
Status: DISCONTINUED | OUTPATIENT
Start: 2021-02-02 | End: 2021-02-04 | Stop reason: HOSPADM

## 2021-02-01 RX ORDER — DEXAMETHASONE SODIUM PHOSPHATE 10 MG/ML
6 INJECTION INTRAMUSCULAR; INTRAVENOUS DAILY
Status: DISCONTINUED | OUTPATIENT
Start: 2021-02-02 | End: 2021-02-04 | Stop reason: HOSPADM

## 2021-02-01 RX ORDER — BUSPIRONE HYDROCHLORIDE 10 MG/1
10 TABLET ORAL DAILY PRN
COMMUNITY

## 2021-02-01 RX ORDER — ACETAMINOPHEN 650 MG/1
650 SUPPOSITORY RECTAL EVERY 6 HOURS PRN
Status: DISCONTINUED | OUTPATIENT
Start: 2021-02-01 | End: 2021-02-04 | Stop reason: HOSPADM

## 2021-02-01 RX ORDER — ACETAMINOPHEN 500 MG
1000 TABLET ORAL PRN
COMMUNITY

## 2021-02-01 RX ORDER — OMEPRAZOLE 20 MG/1
20 CAPSULE, DELAYED RELEASE ORAL DAILY PRN
COMMUNITY

## 2021-02-01 RX ORDER — OMEPRAZOLE 20 MG/1
20 CAPSULE, DELAYED RELEASE ORAL EVERY MORNING
Status: DISCONTINUED | OUTPATIENT
Start: 2021-02-02 | End: 2021-02-01 | Stop reason: CLARIF

## 2021-02-01 RX ORDER — POTASSIUM CHLORIDE 7.45 MG/ML
10 INJECTION INTRAVENOUS ONCE
Status: COMPLETED | OUTPATIENT
Start: 2021-02-01 | End: 2021-02-01

## 2021-02-01 RX ORDER — SODIUM CHLORIDE 0.9 % (FLUSH) 0.9 %
10 SYRINGE (ML) INJECTION PRN
Status: DISCONTINUED | OUTPATIENT
Start: 2021-02-01 | End: 2021-02-04 | Stop reason: HOSPADM

## 2021-02-01 RX ADMIN — OXYCODONE HYDROCHLORIDE AND ACETAMINOPHEN 500 MG: 500 TABLET ORAL at 18:08

## 2021-02-01 RX ADMIN — ALBUTEROL SULFATE 2 PUFF: 90 AEROSOL, METERED RESPIRATORY (INHALATION) at 18:42

## 2021-02-01 RX ADMIN — ZINC SULFATE 220 MG (50 MG) CAPSULE 50 MG: CAPSULE at 18:08

## 2021-02-01 RX ADMIN — DEXAMETHASONE 6 MG: 4 TABLET ORAL at 15:05

## 2021-02-01 RX ADMIN — REMDESIVIR 200 MG: 100 INJECTION, POWDER, LYOPHILIZED, FOR SOLUTION INTRAVENOUS at 20:46

## 2021-02-01 RX ADMIN — ENOXAPARIN SODIUM 40 MG: 40 INJECTION SUBCUTANEOUS at 18:08

## 2021-02-01 RX ADMIN — IOPAMIDOL 60 ML: 755 INJECTION, SOLUTION INTRAVENOUS at 21:05

## 2021-02-01 RX ADMIN — ATORVASTATIN CALCIUM 20 MG: 20 TABLET, FILM COATED ORAL at 20:46

## 2021-02-01 RX ADMIN — ASPIRIN 324 MG: 81 TABLET, CHEWABLE ORAL at 15:05

## 2021-02-01 RX ADMIN — POTASSIUM CHLORIDE 40 MEQ: 1500 TABLET, EXTENDED RELEASE ORAL at 14:12

## 2021-02-01 RX ADMIN — POTASSIUM CHLORIDE 10 MEQ: 10 INJECTION, SOLUTION INTRAVENOUS at 14:12

## 2021-02-01 ASSESSMENT — ENCOUNTER SYMPTOMS
BACK PAIN: 0
SINUS PRESSURE: 0
EYE REDNESS: 0
SORE THROAT: 0
EYE PAIN: 0
ABDOMINAL DISTENTION: 0
SHORTNESS OF BREATH: 0
COUGH: 0
NAUSEA: 0
VOMITING: 0
DIARRHEA: 0
EYE DISCHARGE: 0
WHEEZING: 0

## 2021-02-01 NOTE — ED PROVIDER NOTES
This is an 80-year-old female presents emergency department for increasing generalized weakness, confusion. Daughter is at bedside and states that patient had a fall in May 2020 with head injury and since then has had daily headaches, slowly increasing generalized weakness. She has seen neurology in [facility]. Daughter was concerned this morning because patient had difficulty getting up and getting moving when she is normally completely independent and alert. She denies any shortness of breath, cough, fever, chills, abdominal pain, nausea, vomiting, chest pain, diarrhea, constipation, melena, hematochezia, dysuria. Denies any sick contacts, received Covid-19 vaccination 2 weeks ago. Has a history of tobacco use, is not a current smoker, denies any history of COPD or asthma. She denies any history of MI, stenting. She denies any history of diabetes. The history is provided by the patient, a relative and medical records. The history is limited by the condition of the patient. Review of Systems   Constitutional: Negative for chills and fever. HENT: Negative for ear pain, sinus pressure and sore throat. Eyes: Negative for pain, discharge and redness. Respiratory: Negative for cough, shortness of breath and wheezing. Cardiovascular: Negative for chest pain. Gastrointestinal: Negative for abdominal distention, diarrhea, nausea and vomiting. Genitourinary: Negative for dysuria and frequency. Musculoskeletal: Negative for arthralgias and back pain. Skin: Negative for rash and wound. Neurological: Negative for weakness and headaches. Hematological: Negative for adenopathy. All other systems reviewed and are negative. Physical Exam  Vitals signs and nursing note reviewed. Constitutional:       Appearance: She is not ill-appearing or diaphoretic. Comments: Well-appearing elderly female lying in bed comfortably. HENT:      Head: Normocephalic and atraumatic. at home, performs all her ADLs. She received Covid vaccine 2 weeks ago, has had no sick exposures. She has had no falls or head injuries. Work-up remarkable for hypokalemia, hypoxia at rest, positive COVID-19 test.  Patient also had episode of chest pain while in the ED that resolved without treatment, patient was then given aspirin. Patient was given dose of Decadron, is on 2.5 L of O2 via nasal cannula. Patient will be admitted to the hospital for further monitoring, work-up and treatment for COVID-19 and episode of chest pain. Patient, daughter comfortable this plan, all questions were answered. ED Course as of Feb 01 1833   Mon Feb 01, 2021   1442 Patient re-evaluate, she had episode of chest pain that resolved. EKG no STEMI, aspirin ordered. RN reports that pulse ox was 88% on RA at rest. She is on 2.5 liters/min o2 now, 93% spO2. [RH]   1928 Dr. Carol Grant discussed case with Dr. Simmie Halsted, he agreed to admit the patient. [RH]      ED Course User Index  [RH] [address], DO      ED Course as of Feb 01 1833   Mon Feb 01, 2021   1442 Patient re-evaluate, she had episode of chest pain that resolved. EKG no STEMI, aspirin ordered. RN reports that pulse ox was 88% on RA at rest. She is on 2.5 liters/min o2 now, 93% spO2. [RH]   7108 Dr. Carol Grant discussed case with Dr. Simmie Halsted, he agreed to admit the patient. [RH]      ED Course User Index  [RH] [address],        --------------------------------------------- PAST HISTORY ---------------------------------------------  Past Medical History:  has a past medical history of GERD (gastroesophageal reflux disease), Hyperlipidemia, and Hypertension. Past Surgical History:  has a past surgical history that includes Cholecystectomy; Appendectomy; Hysterectomy; and Colonoscopy (9/12/13). Social History:  reports that she has never smoked. She has never used smokeless tobacco. She reports current alcohol use.  She mmol/L   Urinalysis   Result Value Ref Range    Color, UA Yellow Straw/Yellow    Clarity, UA Clear Clear    Glucose, Ur Negative Negative mg/dL    Bilirubin Urine Negative Negative    Ketones, Urine Negative Negative mg/dL    Specific Gravity, UA 1.015 1.005 - 1.030    Blood, Urine Negative Negative    pH, UA 7.5 5.0 - 9.0    Protein, UA 30 (A) Negative mg/dL    Urobilinogen, Urine 4.0 (A) <2.0 E.U./dL    Nitrite, Urine Negative Negative    Leukocyte Esterase, Urine SMALL (A) Negative   Troponin   Result Value Ref Range    Troponin <0.01 0.00 - 0.03 ng/mL   COVID-19   Result Value Ref Range    SARS-CoV-2, NAAT DETECTED (A) Not Detected   Magnesium   Result Value Ref Range    Magnesium 1.8 1.6 - 2.6 mg/dL   Arterial Blood Gas, Respiratory Only   Result Value Ref Range    Source: Arterial     FIO2 Arterial 28.0     Pt Temp 37.0     PH (TEMPERATURE CORRECTED) 7.498 (H) 7.350 - 7.450    PCO2 (TEMP CORRECTED) 35.3 35.0 - 45.0 mmHg    PO2 (TEMP CORRECTED) 85.2 (H) 60.0 - 80.0 mmHg    HCO3, Arterial 27.4 (H) 22.0 - 26.0 mmol/L    B.E. 4.3 (H) -3.0 - 0.0 mmol/L    O2 Sat 97.3 92.0 - 98.5 %    HGB, (EST) 13.6 11.5 - 15.5 g/dL    HCT (EST) 40.0 34.0 - 48.0 %     ID 1,221     DEVICE [id number]     Delivery Systems Cannula    Microscopic Urinalysis   Result Value Ref Range    WBC, UA 2-5 0 - 5 /HPF    RBC, UA 0-1 0 - 2 /HPF    Epithelial Cells, UA RARE /HPF    Bacteria, UA RARE (A) None Seen /HPF   D-Dimer, Quantitative   Result Value Ref Range    D-Dimer, Quant 809 ng/mL DDU   Fibrinogen   Result Value Ref Range    Fibrinogen >700 (H) 225 - 540 mg/dL   EKG 12 Lead   Result Value Ref Range    Ventricular Rate 72 BPM    Atrial Rate 72 BPM    P-R Interval 158 ms    QRS Duration 86 ms    Q-T Interval 390 ms    QTc Calculation (Bazett) 427 ms    P Axis 73 degrees    R Axis 41 degrees    T Axis 73 degrees       RADIOLOGY:  XR CHEST PORTABLE   Final Result   1. Multifocal bilateral patchy pulmonary infiltrates. CT HEAD WO CONTRAST    (Results Pending)       EKG: This EKG is signed and interpreted by me. Rate: 74  Rhythm: Sinus  Interpretation: no acute changes  Comparison: stable as compared to patient's most recent EKG      ------------------------- NURSING NOTES AND VITALS REVIEWED ---------------------------  Date / Time Roomed:  2/1/2021 12:40 PM  ED Bed Assignment:  [id number]    The nursing notes within the ED encounter and vital signs as below have been reviewed. Patient Vitals for the past 24 hrs:   BP Temp Temp src Pulse Resp SpO2 Height Weight   02/01/21 1745 (!) 158/60 97.2 °F (36.2 °C) Temporal 69 18 96 % -- --   02/01/21 1642 (!) 178/80 -- -- 82 18 95 % -- --   02/01/21 1212 -- -- -- -- -- -- 5' 3\" (1.6 m) 120 lb (54.4 kg)   02/01/21 1210 (!) 182/59 -- -- -- 18 -- -- --   02/01/21 1156 -- 97.5 °F (36.4 °C) -- 89 -- 93 % -- --       Oxygen Saturation Interpretation: Improved after treatment    ------------------------------------------ PROGRESS NOTES ------------------------------------------  Re-evaluation(s):  See ED course    Counseling:  I have spoken with the patient and Daughter and discussed todays results, in addition to providing specific details for the plan of care and counseling regarding the diagnosis and prognosis. Their questions are answered at this time and they are agreeable with the plan of admission.    --------------------------------- ADDITIONAL PROVIDER NOTES ---------------------------------  Consultations:  See ED course  This patient's ED course included: a personal history and physicial examination, re-evaluation prior to disposition, multiple bedside re-evaluations, IV medications, cardiac monitoring and continuous pulse oximetry    This patient has remained hemodynamically stable during their ED course. Diagnosis:  1. Acute respiratory failure with hypoxia ([address]    2. COVID-19    3. Hypokalemia    4.  Chest pain, unspecified type        Disposition:   Patient's disposition: Admit to telemetry, COVID-19 floor  patient's condition is stable.            [address] DO  Resident  02/01/21 1871

## 2021-02-01 NOTE — LETTER
Beneficiary Notification Letter  BPCI Advanced     Your Doctor or [address],    We wanted to let you know that your health care provider, [address] [place] [address] [place], has volunteered to take part in our [facility] for [facility] Medicaid Services (CMS) Bundled Payments for [facility] (BPCI Advanced). This doesnt change your Medicare rights or benefits and you dont need to do anything. What are bundled payments? A bundled payment combines, or bundles together, payments that Medicare makes to your health care providers for the many different kinds of medical services you might get in a specific time period. In BPCI Advanced, this time period could include a hospital inpatient stay or outpatient procedure, plus 90 days. Why would Medicare bundle payments? Bundled payments are thought of as a value-based way to pay because health care providers are responsible for both the quality and cost of medical care they give. This is a relatively new way of paying health care providers compared to thefee-for-service way Medicare has traditionally paid, where providers are paid separately for each service they provide. Bundled payments encourage these providers to work together to provide better, more coordinated care during your hospital stay, or outpatient procedure, and through your recovery. What does BPCI Advance mean for me? Youre more likely to get even better care when hospitals, doctors, and other health care providers work together. In BPCI Advanced, hospitals, doctors, and other health care providers may be rewarded for providing better, more coordinated health care. Medicare will watch BPCI Advanced participants closely to make sure that you and other patients keep getting efficient, high quality care. What do I need to know about BPCI Advanced? · All of the inpatient and outpatient Clinical Episodes that are currently included under BPCI Advanced. A Clinical Episode is a grouping of medical conditions or diagnoses that are included in the [address] [facility].

## 2021-02-02 PROBLEM — G93.41 METABOLIC ENCEPHALOPATHY: Status: ACTIVE | Noted: 2021-02-02

## 2021-02-02 PROBLEM — I51.7 CARDIOMEGALY: Status: ACTIVE | Noted: 2021-02-02

## 2021-02-02 LAB
ALBUMIN SERPL-MCNC: 3.4 G/DL (ref 3.5–5.2)
ALP BLD-CCNC: 37 U/L (ref 35–104)
ALT SERPL-CCNC: 8 U/L (ref 0–32)
ANION GAP SERPL CALCULATED.3IONS-SCNC: 11 MMOL/L (ref 7–16)
AST SERPL-CCNC: 17 U/L (ref 0–31)
BILIRUB SERPL-MCNC: 0.4 MG/DL (ref 0–1.2)
BUN BLDV-MCNC: 16 MG/DL (ref 8–23)
CALCIUM SERPL-MCNC: 8.7 MG/DL (ref 8.6–10.2)
CHLORIDE BLD-SCNC: 103 MMOL/L (ref 98–107)
CHOLESTEROL, TOTAL: 159 MG/DL (ref 0–199)
CO2: 28 MMOL/L (ref 22–29)
CREAT SERPL-MCNC: 0.7 MG/DL (ref 0.5–1)
GFR AFRICAN AMERICAN: >60
GFR NON-AFRICAN AMERICAN: >60 ML/MIN/1.73
GLUCOSE BLD-MCNC: 147 MG/DL (ref 74–99)
HBA1C MFR BLD: 5.9 % (ref 4–5.6)
HCT VFR BLD CALC: 40.5 % (ref 34–48)
HDLC SERPL-MCNC: 48 MG/DL
HEMOGLOBIN: 14 G/DL (ref 11.5–15.5)
LDL CHOLESTEROL CALCULATED: 94 MG/DL (ref 0–99)
MAGNESIUM: 2 MG/DL (ref 1.6–2.6)
MCH RBC QN AUTO: 30.9 PG (ref 26–35)
MCHC RBC AUTO-ENTMCNC: 34.6 % (ref 32–34.5)
MCV RBC AUTO: 89.4 FL (ref 80–99.9)
PDW BLD-RTO: 12.6 FL (ref 11.5–15)
PLATELET # BLD: 194 E9/L (ref 130–450)
PMV BLD AUTO: 9.8 FL (ref 7–12)
POTASSIUM REFLEX MAGNESIUM: 3.2 MMOL/L (ref 3.5–5)
POTASSIUM SERPL-SCNC: 3.2 MMOL/L (ref 3.5–5)
PROCALCITONIN: 0.05 NG/ML (ref 0–0.08)
RBC # BLD: 4.53 E12/L (ref 3.5–5.5)
SODIUM BLD-SCNC: 142 MMOL/L (ref 132–146)
TOTAL CK: 24 U/L (ref 20–180)
TOTAL CK: 24 U/L (ref 20–180)
TOTAL PROTEIN: 6.4 G/DL (ref 6.4–8.3)
TRIGL SERPL-MCNC: 86 MG/DL (ref 0–149)
TROPONIN: <0.01 NG/ML (ref 0–0.03)
TROPONIN: <0.01 NG/ML (ref 0–0.03)
VLDLC SERPL CALC-MCNC: 17 MG/DL
WBC # BLD: 3.8 E9/L (ref 4.5–11.5)

## 2021-02-02 PROCEDURE — 84484 ASSAY OF TROPONIN QUANT: CPT

## 2021-02-02 PROCEDURE — 82550 ASSAY OF CK (CPK): CPT

## 2021-02-02 PROCEDURE — 84145 PROCALCITONIN (PCT): CPT

## 2021-02-02 PROCEDURE — 80061 LIPID PANEL: CPT

## 2021-02-02 PROCEDURE — 2140000000 HC CCU INTERMEDIATE R&B

## 2021-02-02 PROCEDURE — 97166 OT EVAL MOD COMPLEX 45 MIN: CPT

## 2021-02-02 PROCEDURE — 6370000000 HC RX 637 (ALT 250 FOR IP): Performed by: NURSE PRACTITIONER

## 2021-02-02 PROCEDURE — 2580000003 HC RX 258: Performed by: NURSE PRACTITIONER

## 2021-02-02 PROCEDURE — 2500000003 HC RX 250 WO HCPCS: Performed by: NURSE PRACTITIONER

## 2021-02-02 PROCEDURE — 36415 COLL VENOUS BLD VENIPUNCTURE: CPT

## 2021-02-02 PROCEDURE — 83735 ASSAY OF MAGNESIUM: CPT

## 2021-02-02 PROCEDURE — 97530 THERAPEUTIC ACTIVITIES: CPT

## 2021-02-02 PROCEDURE — 6360000002 HC RX W HCPCS: Performed by: NURSE PRACTITIONER

## 2021-02-02 PROCEDURE — 2700000000 HC OXYGEN THERAPY PER DAY

## 2021-02-02 PROCEDURE — 97162 PT EVAL MOD COMPLEX 30 MIN: CPT

## 2021-02-02 PROCEDURE — 85027 COMPLETE CBC AUTOMATED: CPT

## 2021-02-02 PROCEDURE — 80048 BASIC METABOLIC PNL TOTAL CA: CPT

## 2021-02-02 PROCEDURE — 6370000000 HC RX 637 (ALT 250 FOR IP): Performed by: INTERNAL MEDICINE

## 2021-02-02 PROCEDURE — 83036 HEMOGLOBIN GLYCOSYLATED A1C: CPT

## 2021-02-02 PROCEDURE — 80053 COMPREHEN METABOLIC PANEL: CPT

## 2021-02-02 RX ORDER — ERGOCALCIFEROL 1.25 MG/1
50000 CAPSULE ORAL WEEKLY
Status: DISCONTINUED | OUTPATIENT
Start: 2021-02-02 | End: 2021-02-04 | Stop reason: HOSPADM

## 2021-02-02 RX ORDER — FAMOTIDINE 20 MG/1
20 TABLET, FILM COATED ORAL DAILY
Status: DISCONTINUED | OUTPATIENT
Start: 2021-02-02 | End: 2021-02-04 | Stop reason: HOSPADM

## 2021-02-02 RX ORDER — MECOBALAMIN 5000 MCG
10 TABLET,DISINTEGRATING ORAL NIGHTLY
Status: DISCONTINUED | OUTPATIENT
Start: 2021-02-02 | End: 2021-02-04 | Stop reason: HOSPADM

## 2021-02-02 RX ADMIN — BENZONATATE 100 MG: 100 CAPSULE ORAL at 08:26

## 2021-02-02 RX ADMIN — ALBUTEROL SULFATE 2 PUFF: 90 AEROSOL, METERED RESPIRATORY (INHALATION) at 23:31

## 2021-02-02 RX ADMIN — ALBUTEROL SULFATE 2 PUFF: 90 AEROSOL, METERED RESPIRATORY (INHALATION) at 04:29

## 2021-02-02 RX ADMIN — ERGOCALCIFEROL 50000 UNITS: 1.25 CAPSULE ORAL at 10:33

## 2021-02-02 RX ADMIN — DEXAMETHASONE SODIUM PHOSPHATE 6 MG: 10 INJECTION INTRAMUSCULAR; INTRAVENOUS at 14:17

## 2021-02-02 RX ADMIN — OXYCODONE HYDROCHLORIDE AND ACETAMINOPHEN 500 MG: 500 TABLET ORAL at 08:26

## 2021-02-02 RX ADMIN — FAMOTIDINE 20 MG: 20 TABLET ORAL at 10:33

## 2021-02-02 RX ADMIN — Medication 10 ML: at 20:35

## 2021-02-02 RX ADMIN — ZINC SULFATE 220 MG (50 MG) CAPSULE 50 MG: CAPSULE at 08:26

## 2021-02-02 RX ADMIN — AMLODIPINE BESYLATE 5 MG: 5 TABLET ORAL at 08:26

## 2021-02-02 RX ADMIN — PANTOPRAZOLE SODIUM 40 MG: 40 TABLET, DELAYED RELEASE ORAL at 05:36

## 2021-02-02 RX ADMIN — ALBUTEROL SULFATE 2 PUFF: 90 AEROSOL, METERED RESPIRATORY (INHALATION) at 00:16

## 2021-02-02 RX ADMIN — REMDESIVIR 100 MG: 100 INJECTION, POWDER, LYOPHILIZED, FOR SOLUTION INTRAVENOUS at 17:41

## 2021-02-02 RX ADMIN — ACETAMINOPHEN 650 MG: 325 TABLET ORAL at 20:34

## 2021-02-02 RX ADMIN — Medication 10 MG: at 20:34

## 2021-02-02 RX ADMIN — ATORVASTATIN CALCIUM 20 MG: 20 TABLET, FILM COATED ORAL at 20:34

## 2021-02-02 RX ADMIN — ENOXAPARIN SODIUM 40 MG: 40 INJECTION SUBCUTANEOUS at 08:26

## 2021-02-02 ASSESSMENT — PAIN SCALES - GENERAL: PAINLEVEL_OUTOF10: 3

## 2021-02-02 ASSESSMENT — PAIN DESCRIPTION - DESCRIPTORS: DESCRIPTORS: ACHING

## 2021-02-02 ASSESSMENT — PAIN DESCRIPTION - ONSET: ONSET: PROGRESSIVE

## 2021-02-02 ASSESSMENT — PAIN DESCRIPTION - LOCATION: LOCATION: HEAD

## 2021-02-02 NOTE — ACP (ADVANCE CARE PLANNING)
Advance Care Planning     General Advance Care Planning (ACP) Conversation    Date of Conversation: 2/1/2021  Conducted with: Patient with Decision Making Capacity    Healthcare Decision Maker:        Click here to complete [employer] including selection of the [employer] Relationship (ie \"Primary\")  Today we documented Decision Maker(s) consistent with Legal Next of Kin hierarchy. Content/Action Overview: Has ACP document(s) on file - reflects the patient's care preferences  Reviewed DNR/DNI and patient elects Full Code (Attempt Resuscitation)     Ventilation: \"If you were unable to breath on your own and your chance of recovery was unlikely, what would be your preference about the use of a ventilator (breathing machine) if it was available to you? \"  The patient would desire the use of a ventilator.     Resuscitation: \"In the event your heart stopped as a result of an underlying serious health condition, would you want attempts made to restart your heart, or would you prefer a natural death? \"  Yes, attempt to resuscitate.           Conversation Outcomes / Follow-Up Plan:  ACP complete - no further action today  Reviewed DNR/DNI and patient elects Full Code (Attempt Resuscitation)        Length of Voluntary ACP Conversation in minutes:  <16 minutes (Non-Billable)    Waylon Omer

## 2021-02-02 NOTE — H&P
[address] [facility]  History and Physical      CHIEF COMPLAINT: AMS      HISTORY OF PRESENT ILLNESS:      The patient is a 80 y.o. female patient of Dr. Kori Madrid history of GERD, hypertension, hyperlipidemia who presents with AMS and generalized weakness. Patient was brought in by family with complaints of increasing weakness and confusion. Per daughter patient has a history of fall with TBI and chronic headaches. Patient has been progressively weak and was unable to get out of bed yesterday. Denies fever cough or shortness of breath. No vomiting or diarrhea. Denies Covid exposure. Did have first Covid vaccine 2 weeks ago. Pt is a poor historian. History is largely obtained from chart review and discussions with staff/family as available. Past Medical History:    Past Medical History:   Diagnosis Date    GERD (gastroesophageal reflux disease)     Hyperlipidemia     Hypertension        Past Surgical History:    Past Surgical History:   Procedure Laterality Date    APPENDECTOMY      CHOLECYSTECTOMY      COLONOSCOPY  9/12/13    HYSTERECTOMY         Medications Prior to Admission:    Medications Prior to Admission: omeprazole (PRILOSEC) 20 MG delayed release capsule, Take 20 mg by mouth daily as needed (gerd)  busPIRone (BUSPAR) 10 MG tablet, Take 10 mg by mouth daily as needed (anxiety)   predniSONE (DELTASONE) 10 MG tablet, Take 10 mg by mouth daily  atorvastatin (LIPITOR) 20 MG tablet, Take 20 mg by mouth daily   amLODIPine (NORVASC) 5 MG tablet, Take 5 mg by mouth daily   acetaminophen (TYLENOL) 500 MG tablet, Take 1,000 mg by mouth as needed    Allergies:    Patient has no known allergies. Social History:    reports that she has never smoked. She has never used smokeless tobacco. She reports current alcohol use. She reports that she does not use drugs.     Family History:   limited by mental status     REVIEW OF SYSTEMS:  As above in the HPI, otherwise negative    PHYSICAL EXAM:    Vitals:  /78   Pulse 58   Temp 96.3 °F (35.7 °C)   Resp 16   Ht 5' 3\" (1.6 m)   Wt 121 lb (54.9 kg)   SpO2 92%   BMI 21.43 kg/m²     General:  Awake, alert, oriented X 3. Well developed, well nourished, well groomed. No apparent distress. HEENT:  Normocephalic, atraumatic. Pupils equal, round, reactive to light. No scleral icterus. No conjunctival injection. Normal lips, teeth, and gums. No nasal discharge. Neck:  Supple  Heart:  RRR, no murmurs, gallops, rubs  Lungs:  CTA bilaterally, bilat symmetrical expansion, no wheeze, rales, or rhonchi  Abdomen:   Bowel sounds present, soft, nontender, no masses, no organomegaly, no peritoneal signs  Extremities:  No clubbing, cyanosis, or edema  Skin:  Warm and dry, no open lesions or rash  Neuro:  Cranial nerves 2-12 intact, no focal deficits  Breast: deferred  Rectal: deferred  Genitalia:  deferred    LABS:    CBC with Differential:    Lab Results   Component Value Date    WBC 5.5 02/01/2021    RBC 4.32 02/01/2021    HGB 12.8 02/01/2021    HCT 38.2 02/01/2021     02/01/2021    MCV 88.4 02/01/2021    MCH 29.6 02/01/2021    MCHC 33.5 02/01/2021    RDW 12.5 02/01/2021    LYMPHOPCT 8.7 02/01/2021    MONOPCT 9.6 02/01/2021    BASOPCT 0.2 02/01/2021    MONOSABS 0.55 02/01/2021    LYMPHSABS 0.50 02/01/2021    EOSABS 0.00 02/01/2021    BASOSABS 0.00 02/01/2021     CMP:    Lab Results   Component Value Date     02/01/2021    K 3.6 02/01/2021    K 2.8 02/01/2021     02/01/2021    CO2 25 02/01/2021    BUN 12 02/01/2021    CREATININE 0.7 02/01/2021    GFRAA >60 02/01/2021    LABGLOM >60 02/01/2021    GLUCOSE 133 02/01/2021    PROT 6.1 02/01/2021    LABALBU 3.5 02/01/2021    CALCIUM 8.7 02/01/2021    BILITOT 0.5 02/01/2021    ALKPHOS 38 02/01/2021    AST 17 02/01/2021    ALT 8 02/01/2021     Magnesium:    Lab Results   Component Value Date    MG 1.8 02/01/2021     Phosphorus:  No results found for: PHOS  PT/INR:  No results found for: PROTIME, INR  Last 3 Troponin:    Lab Results   Component Value Date    TROPONINI <0.01 02/02/2021    TROPONINI <0.01 02/02/2021    TROPONINI <0.01 02/01/2021     U/A:    Lab Results   Component Value Date    COLORU Yellow 02/01/2021    PROTEINU 30 02/01/2021    PHUR 7.5 02/01/2021    WBCUA 2-5 02/01/2021    RBCUA 0-1 02/01/2021    BACTERIA RARE 02/01/2021    CLARITYU Clear 02/01/2021    SPECGRAV 1.015 02/01/2021    LEUKOCYTESUR SMALL 02/01/2021    UROBILINOGEN 4.0 02/01/2021    BILIRUBINUR Negative 02/01/2021    BLOODU Negative 02/01/2021    GLUCOSEU Negative 02/01/2021     ABG:    Lab Results   Component Value Date    BE 4.3 02/01/2021    O2SAT 97.3 02/01/2021     HgBA1c:  No results found for: LABA1C  FLP:    Lab Results   Component Value Date    TRIG 83 05/21/2019    HDL 66 05/21/2019    LDLCALC 88 05/21/2019    LABVLDL 17 05/21/2019     TSH:  No results found for: TSH    CTA PULMONARY W CONTRAST   Final Result   1. No pulmonary embolism. 2. Bilateral pulmonary infiltrates with an appearance consistent with   patient's history of COVID-19.   3. Cardiomegaly. CT HEAD WO CONTRAST   Final Result   No acute intracranial abnormality. XR CHEST PORTABLE   Final Result   1. Multifocal bilateral patchy pulmonary infiltrates. ASSESSMENT:      Principal Problem:    Pneumonia due to COVID-19 virus  Active Problems:    Chest pain    Essential hypertension    Hyperlipidemia LDL goal <100    Gastroesophageal reflux disease without esophagitis    Hypokalemia    Cardiomegaly    Metabolic encephalopathy  Resolved Problems:    * No resolved hospital problems.  *      PLAN:    80-year-old female history of hypertension, hyperlipidemia, GERD admitted with Covid 19--patient had first Covid vaccination 2 weeks ago    SARS-CoV-2 +   Isolation--droplet plus  Supplemental O2 wean as tolerated  MDIs, no nebulizers  Incentive spirometer  Encourage proning as needed  Remdesivir  Decadron  Melatonin, Pepcid, Zinc, vitamin C, vitamin D  Monitor inflammatory markers  Medications for other co morbidities cont as appropriate w dosage adjustments as necessary  PT/OT  DVT PPx  DC planning        Electronically signed by Esdras Celestin MD on 2/2/2021 at 8:19 AM

## 2021-02-02 NOTE — PROGRESS NOTES
Occupational Therapy  OCCUPATIONAL THERAPY INITIAL EVALUATION      Date:2021  Patient Name: Dipti Willson  MRN: [id number]  : 10/12/1932  Room: [address] [place], [place] [address]    Evaluating OT: Claribel Rosario OTR/L #[id number]    Referring physician: LORNE Mcclain CNP  AM-PAC Daily Activity Raw Score:   Recommended Adaptive Equipment: Patient may benefit from a shower chair     Reason for Admission/Diagnosis: Pneumonia due to COVID-19 virus   Pertinent Medical History/Surgery: GERD, HLD, HTN, appendectomy    Precautions:  Droplet plus (COVID-19), Falls, tele, supplemental O2     Home Living: Pt lives with her  in a two story house. Bedroom and bathroom are located on the second floor with half bathroom on the first floor. Laundry is located in the basement. Bathroom setup: Walk-in shower, standard commode  Equipment owned: none  Prior Level of Function:   I with ADLs ,  I with IADLs. Patient reports she shares home management responsibilities with spouse. [place] and assists with upkeep. Patient was using no device for functional mobility.    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  [place]    Pain Level: No pain reported  Cognition: A&O: self:yes, place:yes, time: yes, situation:yes  Communication: WFL  Follows 2-3 step directions   Memory:  good    Sequencing:  good    Problem solving:  fair +   Judgement/safety:  fair +     Functional Assessment:   Initial Eval Status  Date: 21 Treatment Status  Date: Short Term Goals=LTG  Treatment frequency: PRN 1-3 x/week   Feeding Modified [place]       Grooming Stand by Assist   Washed hands while standing at sink  Modified [place]     UB Dressing Stand by Assist   Modified [place]    LB Dressing Stand by Assist  Donned/doffed socks, verbal cues for technique   Modified [place]    Bathing Minimal Assist   Modified [place]    Toileting Stand by Assist  Completed toileting using standard commode   Modified [place]    Bed Mobility  Supine to sit: SBA   Sit to supine: SBA  Supine to sit: Mod I   Sit to supine: Mod I   Functional Transfers Sit to stand: SBA  Stand to sit: SBA  Toilet Transfer: SBA  Verbal cues for safety with management of O2 tubing  Independent   Functional Mobility SBA  Completed functional mobility bed<>bathroom without device  Independent   Balance Sitting:     Static:SBA    Dynamic:SBA  Standing: SBA     Activity Tolerance Fair  Good   Visual/  Perceptual Glasses: no         Safety       Fair+                  Good     Upper Extremities:   Hand Dominance: Right [x]  Left []      ROM and Strength Coordination Short Term Goals=LTG   Right UE Active ROM:  WFL         Strength: 4-/5     Strength: 4-/5 Fine/gross Motor Coordination:  slight impairment, patient demonstrates signs of arthritis            Left UE Active ROM:  WFL         Strength: 4-/5     Strength: 4-/5 Fine/gross Motor Coordination: slight impairment, patient demonstrates signs of arthritis          Hearing: [place]  Sensation:  No c/o numbness or tingling  Tone:  WFL  Edema: none observed B UE                            Comments: Upon arrival, patient semi-supine in bed and agreeable to session. OT evaluation performed with education provided regarding the purpose and benefits of OT session, along with mobility and I/ADL completion. Patient demonstrates functional limitations with ADLs/ functional mobility due to decreased activity tolerance. Education provided regarding energy conservation techniques as they relate to ADLs/ functional mobility. Patient verbalized fair understanding. At end of session, patient semi-supine in bed with call light and phone within reach, along with all lines and tubes intact. Nursing notified.   Skilled monitoring of vitals-  Skilled monitoring of the patient's response throughout treatment. Vitals:   On 2 L O2  Semi-supine at start of session: SpO2 sat 93%, HR 72 bpm  Sitting edge of bed: SpO2 sat 95%, HR 78 bpm  Upon completion of toileting/functional mobility: VdX1dil 93%, HR 80 bpm    Patient would  benefit from continued skilled OT during hospitalization to maximize functional outcomes as they relate to I/ADLs and functional mobility. Eval Complexity: Mod  · Evaluation Complexity: Mod Complexity  ? History: Expanded review of medical records and additional review of physical, cognitive, or psychosocial history related to current functional performance  ? Exam: 5+ performance deficits  ? Assistance/Modification: mod/max assistance or modifications required to perform tasks. May have comorbidities that affect occupational performance.       Assessment of current deficits   Functional mobility [x]  ADLs [x] Strength [x]  Cognition []  Functional transfers  [x] IADLs [x] Safety Awareness [x]  Endurance/Activity tolerance [x]  Fine Motor Coordination [] Balance [x] Vision/perception [] Sensation []   Gross Motor Coordination [] ROM [] Delirium []                  Motor Control []    Plan of Care:  OT 1-3x/week for 1-2 weeks PRN   [x] ADL retraining/modified techniques, along with assistive device recommendations to maximize patient safety and performance with self-care while maintaining precautions   [x]Balance retraining/tolerance tasks for facilitation of postural control with dynamic challenges during ADLs and functional activities   [x] Delirium Prevention/treatment to maximize functional outcomes   [] Cognitive Re-Training/ executive function skills for safe participation in ADLs/IADLs, along with functional activities   [x] Energy conservation techniques/Strategies to improve activity tolerance      [x] Environmental modifications for safe mobility and completion of ADLs    [x] Functional mobility training/DME recommendations for increased performance, safety and fall prevention  while maintaining precautions     [x] Functional Malnutrition indicators have been identified and nursing has been notified to ensure a dietitian consult is ordered.       Mod OT Evaluation     Chon Whiting OTR/L #[id number]

## 2021-02-03 LAB
ALBUMIN SERPL-MCNC: 3.4 G/DL (ref 3.5–5.2)
ALP BLD-CCNC: 35 U/L (ref 35–104)
ALT SERPL-CCNC: 9 U/L (ref 0–32)
ANION GAP SERPL CALCULATED.3IONS-SCNC: 11 MMOL/L (ref 7–16)
AST SERPL-CCNC: 15 U/L (ref 0–31)
BILIRUB SERPL-MCNC: 0.3 MG/DL (ref 0–1.2)
BUN BLDV-MCNC: 22 MG/DL (ref 8–23)
CALCIUM SERPL-MCNC: 8.6 MG/DL (ref 8.6–10.2)
CHLORIDE BLD-SCNC: 106 MMOL/L (ref 98–107)
CO2: 27 MMOL/L (ref 22–29)
CREAT SERPL-MCNC: 0.6 MG/DL (ref 0.5–1)
D DIMER: 624 NG/ML DDU
GFR AFRICAN AMERICAN: >60
GFR NON-AFRICAN AMERICAN: >60 ML/MIN/1.73
GLUCOSE BLD-MCNC: 116 MG/DL (ref 74–99)
HCT VFR BLD CALC: 38.1 % (ref 34–48)
HEMOGLOBIN: 13.1 G/DL (ref 11.5–15.5)
MCH RBC QN AUTO: 30.5 PG (ref 26–35)
MCHC RBC AUTO-ENTMCNC: 34.4 % (ref 32–34.5)
MCV RBC AUTO: 88.6 FL (ref 80–99.9)
PDW BLD-RTO: 12.5 FL (ref 11.5–15)
PLATELET # BLD: 197 E9/L (ref 130–450)
PMV BLD AUTO: 9.9 FL (ref 7–12)
POTASSIUM SERPL-SCNC: 3.3 MMOL/L (ref 3.5–5)
PROCALCITONIN: 0.03 NG/ML (ref 0–0.08)
RBC # BLD: 4.3 E12/L (ref 3.5–5.5)
SODIUM BLD-SCNC: 144 MMOL/L (ref 132–146)
TOTAL PROTEIN: 6.1 G/DL (ref 6.4–8.3)
WBC # BLD: 4.4 E9/L (ref 4.5–11.5)

## 2021-02-03 PROCEDURE — 36415 COLL VENOUS BLD VENIPUNCTURE: CPT

## 2021-02-03 PROCEDURE — 6360000002 HC RX W HCPCS: Performed by: NURSE PRACTITIONER

## 2021-02-03 PROCEDURE — 6370000000 HC RX 637 (ALT 250 FOR IP): Performed by: NURSE PRACTITIONER

## 2021-02-03 PROCEDURE — 80053 COMPREHEN METABOLIC PANEL: CPT

## 2021-02-03 PROCEDURE — 6370000000 HC RX 637 (ALT 250 FOR IP): Performed by: INTERNAL MEDICINE

## 2021-02-03 PROCEDURE — 85027 COMPLETE CBC AUTOMATED: CPT

## 2021-02-03 PROCEDURE — 2580000003 HC RX 258: Performed by: NURSE PRACTITIONER

## 2021-02-03 PROCEDURE — 2500000003 HC RX 250 WO HCPCS: Performed by: NURSE PRACTITIONER

## 2021-02-03 PROCEDURE — 84145 PROCALCITONIN (PCT): CPT

## 2021-02-03 PROCEDURE — 85378 FIBRIN DEGRADE SEMIQUANT: CPT

## 2021-02-03 PROCEDURE — 2140000000 HC CCU INTERMEDIATE R&B

## 2021-02-03 RX ADMIN — ALBUTEROL SULFATE 2 PUFF: 90 AEROSOL, METERED RESPIRATORY (INHALATION) at 05:16

## 2021-02-03 RX ADMIN — OXYCODONE HYDROCHLORIDE AND ACETAMINOPHEN 500 MG: 500 TABLET ORAL at 10:29

## 2021-02-03 RX ADMIN — PANTOPRAZOLE SODIUM 40 MG: 40 TABLET, DELAYED RELEASE ORAL at 06:06

## 2021-02-03 RX ADMIN — AMLODIPINE BESYLATE 5 MG: 5 TABLET ORAL at 10:30

## 2021-02-03 RX ADMIN — ALBUTEROL SULFATE 2 PUFF: 90 AEROSOL, METERED RESPIRATORY (INHALATION) at 10:29

## 2021-02-03 RX ADMIN — FAMOTIDINE 20 MG: 20 TABLET ORAL at 10:30

## 2021-02-03 RX ADMIN — ACETAMINOPHEN 650 MG: 325 TABLET ORAL at 10:35

## 2021-02-03 RX ADMIN — Medication 10 ML: at 21:29

## 2021-02-03 RX ADMIN — ENOXAPARIN SODIUM 40 MG: 40 INJECTION SUBCUTANEOUS at 10:29

## 2021-02-03 RX ADMIN — PROMETHAZINE HYDROCHLORIDE 12.5 MG: 25 TABLET ORAL at 10:35

## 2021-02-03 RX ADMIN — Medication 10 MG: at 21:28

## 2021-02-03 RX ADMIN — ZINC SULFATE 220 MG (50 MG) CAPSULE 50 MG: CAPSULE at 10:30

## 2021-02-03 RX ADMIN — ATORVASTATIN CALCIUM 20 MG: 20 TABLET, FILM COATED ORAL at 21:28

## 2021-02-03 RX ADMIN — SODIUM CHLORIDE 30 ML: 9 INJECTION, SOLUTION INTRAVENOUS at 18:00

## 2021-02-03 RX ADMIN — ALBUTEROL SULFATE 2 PUFF: 90 AEROSOL, METERED RESPIRATORY (INHALATION) at 16:14

## 2021-02-03 RX ADMIN — ALBUTEROL SULFATE 2 PUFF: 90 AEROSOL, METERED RESPIRATORY (INHALATION) at 21:28

## 2021-02-03 RX ADMIN — BUSPIRONE HYDROCHLORIDE 10 MG: 10 TABLET ORAL at 10:30

## 2021-02-03 RX ADMIN — REMDESIVIR 100 MG: 100 INJECTION, POWDER, LYOPHILIZED, FOR SOLUTION INTRAVENOUS at 18:00

## 2021-02-03 RX ADMIN — DEXAMETHASONE SODIUM PHOSPHATE 6 MG: 10 INJECTION INTRAMUSCULAR; INTRAVENOUS at 10:31

## 2021-02-03 RX ADMIN — Medication 10 ML: at 10:29

## 2021-02-03 ASSESSMENT — PAIN SCALES - GENERAL
PAINLEVEL_OUTOF10: 5
PAINLEVEL_OUTOF10: 0
PAINLEVEL_OUTOF10: 0

## 2021-02-03 ASSESSMENT — PAIN DESCRIPTION - LOCATION: LOCATION: NECK

## 2021-02-03 NOTE — PROGRESS NOTES
Subjective:  Feeling better   No CP or SOB  No fever or chills   No uncontrolled pain  No vomiting or diarrhea     Objective:    BP (!) 158/76   Pulse 66   Temp 97.8 °F (36.6 °C) (Oral)   Resp 18   Ht 5' 3\" (1.6 m)   Wt 121 lb (54.9 kg)   SpO2 92%   BMI 21.43 kg/m²     24HR INTAKE/OUTPUT:      Intake/Output Summary (Last 24 hours) at 2/3/2021 1454  Last data filed at 2/3/2021 1043  Gross per 24 hour   Intake 630 ml   Output 450 ml   Net 180 ml       General appearance: NAD, conversant  Neck: FROM, supple   Lungs: Clear bilaterally no wheezes, no rhonchi, no crackles  CV: RRR, no MRGs; normal carotid upstroke and amplitude without Bruits  Abdomen: Soft, non-tender; no masses or HSM  Extremities: No edema, no cyanosis, no clubbing  Skin: Intact no rash, no lesions, no ulcers    Psych: Alert and oriented normal affect  Neuro: Nonfocal  Most Recent Labs  Lab Results   Component Value Date    WBC 4.4 (L) 02/03/2021    HGB 13.1 02/03/2021    HCT 38.1 02/03/2021     02/03/2021     02/03/2021    K 3.3 (L) 02/03/2021     02/03/2021    CREATININE 0.6 02/03/2021    BUN 22 02/03/2021    CO2 27 02/03/2021    GLUCOSE 116 (H) 02/03/2021    ALT 9 02/03/2021    AST 15 02/03/2021    LABA1C 5.9 (H) 02/02/2021     Recent Labs     02/02/21  1027   MG 2.0     Lab Results   Component Value Date    CALCIUM 8.6 02/03/2021        CTA PULMONARY W CONTRAST   Final Result   1. No pulmonary embolism. 2. Bilateral pulmonary infiltrates with an appearance consistent with   patient's history of COVID-19.   3. Cardiomegaly. CT HEAD WO CONTRAST   Final Result   No acute intracranial abnormality. XR CHEST PORTABLE   Final Result   1. Multifocal bilateral patchy pulmonary infiltrates.           Assessment    Principal Problem:    Pneumonia due to COVID-19 virus  Active Problems:    Chest pain    Essential hypertension    Hyperlipidemia LDL goal <100    Gastroesophageal reflux disease without esophagitis Hypokalemia    Cardiomegaly    Metabolic encephalopathy  Resolved Problems:    * No resolved hospital problems. *      Plan:  59-year-old female history of hypertension, hyperlipidemia, GERD admitted with Covid 19--patient had first Covid vaccination 2 weeks ago     SARS-CoV-2 +   Isolation--droplet plus  Supplemental O2 wean as tolerated  MDIs, no nebulizers  Incentive spirometer  Encourage proning as needed  Remdesivir  Decadron  Melatonin, Pepcid, Zinc, vitamin C, vitamin D  Monitor inflammatory markers  Medications for other co morbidities cont as appropriate w dosage adjustments as necessary  PT/OT  DVT PPx  DC planning patient is high risk for decompensation based on advanced age. She desatted to 91% on room air this morning. I discussed possibility of discharge with patient and .   Will reevaluate tomorrow for discharge    Electronically signed by Hamilton Mitchell MD on 2/3/2021 at 2:54 PM

## 2021-02-03 NOTE — CARE COORDINATION
Care Coordination:  Pt has been on room air for over 24 hours with sat of 92%-94%, no temp. Pt continues on Remdesivir  3/5 today. Iv decadron,vit c, protonix, melatonin. Labs-Procalcitonin 0.3-D dimer 624 (800)  Fibrinogen 800 on lovoenox sq.    ptx assessment 18/24. DC plan remains home. Family to transport home.     Rosie Cottrell

## 2021-02-03 NOTE — CARE COORDINATION
Care Coordination:  Met with pt in room, Covid positive, resting comfortably on room air. Discussed discharge planning in regards to [facility]. She is agreeable to [facility] for skilled nursing, cpa and med compliance. She does not feel need for ptx or otx. She chose [facility]. Referral made to [facility].  They will follow    [place]

## 2021-02-04 VITALS
DIASTOLIC BLOOD PRESSURE: 72 MMHG | WEIGHT: 121.04 LBS | HEART RATE: 83 BPM | TEMPERATURE: 97.8 F | BODY MASS INDEX: 21.45 KG/M2 | SYSTOLIC BLOOD PRESSURE: 138 MMHG | RESPIRATION RATE: 18 BRPM | OXYGEN SATURATION: 95 % | HEIGHT: 63 IN

## 2021-02-04 LAB
ALBUMIN SERPL-MCNC: 3.1 G/DL (ref 3.5–5.2)
ALP BLD-CCNC: 34 U/L (ref 35–104)
ALT SERPL-CCNC: 11 U/L (ref 0–32)
ANION GAP SERPL CALCULATED.3IONS-SCNC: 11 MMOL/L (ref 7–16)
AST SERPL-CCNC: 18 U/L (ref 0–31)
BILIRUB SERPL-MCNC: 0.3 MG/DL (ref 0–1.2)
BUN BLDV-MCNC: 18 MG/DL (ref 8–23)
CALCIUM SERPL-MCNC: 8.5 MG/DL (ref 8.6–10.2)
CHLORIDE BLD-SCNC: 111 MMOL/L (ref 98–107)
CO2: 26 MMOL/L (ref 22–29)
CREAT SERPL-MCNC: 0.6 MG/DL (ref 0.5–1)
D DIMER: 639 NG/ML DDU
GFR AFRICAN AMERICAN: >60
GFR NON-AFRICAN AMERICAN: >60 ML/MIN/1.73
GLUCOSE BLD-MCNC: 102 MG/DL (ref 74–99)
HCT VFR BLD CALC: 39.6 % (ref 34–48)
HEMOGLOBIN: 13.1 G/DL (ref 11.5–15.5)
MCH RBC QN AUTO: 29.6 PG (ref 26–35)
MCHC RBC AUTO-ENTMCNC: 33.1 % (ref 32–34.5)
MCV RBC AUTO: 89.6 FL (ref 80–99.9)
PDW BLD-RTO: 12.4 FL (ref 11.5–15)
PLATELET # BLD: 210 E9/L (ref 130–450)
PMV BLD AUTO: 9.8 FL (ref 7–12)
POTASSIUM SERPL-SCNC: 3.2 MMOL/L (ref 3.5–5)
RBC # BLD: 4.42 E12/L (ref 3.5–5.5)
SODIUM BLD-SCNC: 148 MMOL/L (ref 132–146)
TOTAL PROTEIN: 5.7 G/DL (ref 6.4–8.3)
WBC # BLD: 5.7 E9/L (ref 4.5–11.5)

## 2021-02-04 PROCEDURE — 85027 COMPLETE CBC AUTOMATED: CPT

## 2021-02-04 PROCEDURE — 6370000000 HC RX 637 (ALT 250 FOR IP): Performed by: INTERNAL MEDICINE

## 2021-02-04 PROCEDURE — 6370000000 HC RX 637 (ALT 250 FOR IP): Performed by: NURSE PRACTITIONER

## 2021-02-04 PROCEDURE — 6360000002 HC RX W HCPCS: Performed by: NURSE PRACTITIONER

## 2021-02-04 PROCEDURE — 85378 FIBRIN DEGRADE SEMIQUANT: CPT

## 2021-02-04 PROCEDURE — 2580000003 HC RX 258: Performed by: NURSE PRACTITIONER

## 2021-02-04 PROCEDURE — 80053 COMPREHEN METABOLIC PANEL: CPT

## 2021-02-04 PROCEDURE — 6360000002 HC RX W HCPCS: Performed by: INTERNAL MEDICINE

## 2021-02-04 PROCEDURE — 36415 COLL VENOUS BLD VENIPUNCTURE: CPT

## 2021-02-04 RX ORDER — POTASSIUM CHLORIDE 20 MEQ/1
40 TABLET, EXTENDED RELEASE ORAL DAILY
Status: DISCONTINUED | OUTPATIENT
Start: 2021-02-04 | End: 2021-02-04 | Stop reason: HOSPADM

## 2021-02-04 RX ORDER — ONDANSETRON 4 MG/1
4 TABLET, FILM COATED ORAL 3 TIMES DAILY PRN
Qty: 15 TABLET | Refills: 0 | Status: SHIPPED | OUTPATIENT
Start: 2021-02-04

## 2021-02-04 RX ORDER — DEXAMETHASONE 6 MG/1
6 TABLET ORAL
Qty: 5 TABLET | Refills: 0 | DISCHARGE
Start: 2021-02-04 | End: 2021-02-04 | Stop reason: HOSPADM

## 2021-02-04 RX ORDER — MECOBALAMIN 5000 MCG
10 TABLET,DISINTEGRATING ORAL NIGHTLY
Qty: 15 TABLET | Refills: 0 | COMMUNITY
Start: 2021-02-04

## 2021-02-04 RX ORDER — PROCHLORPERAZINE EDISYLATE 5 MG/ML
5 INJECTION INTRAMUSCULAR; INTRAVENOUS EVERY 4 HOURS PRN
Status: DISCONTINUED | OUTPATIENT
Start: 2021-02-04 | End: 2021-02-04 | Stop reason: HOSPADM

## 2021-02-04 RX ORDER — ALBUTEROL SULFATE 90 UG/1
2 AEROSOL, METERED RESPIRATORY (INHALATION) EVERY 6 HOURS
Qty: 1 INHALER | Refills: 3 | Status: SHIPPED | OUTPATIENT
Start: 2021-02-04

## 2021-02-04 RX ORDER — ZINC SULFATE 50(220)MG
50 CAPSULE ORAL DAILY
Qty: 15 CAPSULE | Refills: 0 | COMMUNITY
Start: 2021-02-04 | End: 2021-02-19

## 2021-02-04 RX ADMIN — ALBUTEROL SULFATE 2 PUFF: 90 AEROSOL, METERED RESPIRATORY (INHALATION) at 10:42

## 2021-02-04 RX ADMIN — DEXAMETHASONE SODIUM PHOSPHATE 6 MG: 10 INJECTION INTRAMUSCULAR; INTRAVENOUS at 09:42

## 2021-02-04 RX ADMIN — POTASSIUM CHLORIDE 40 MEQ: 1500 TABLET, EXTENDED RELEASE ORAL at 13:57

## 2021-02-04 RX ADMIN — PROCHLORPERAZINE EDISYLATE 5 MG: 5 INJECTION INTRAMUSCULAR; INTRAVENOUS at 10:42

## 2021-02-04 RX ADMIN — Medication 10 ML: at 09:43

## 2021-02-04 RX ADMIN — ZINC SULFATE 220 MG (50 MG) CAPSULE 50 MG: CAPSULE at 09:41

## 2021-02-04 RX ADMIN — HYDRALAZINE HYDROCHLORIDE 10 MG: 20 INJECTION, SOLUTION INTRAMUSCULAR; INTRAVENOUS at 09:53

## 2021-02-04 RX ADMIN — ENOXAPARIN SODIUM 40 MG: 40 INJECTION SUBCUTANEOUS at 09:43

## 2021-02-04 RX ADMIN — OXYCODONE HYDROCHLORIDE AND ACETAMINOPHEN 500 MG: 500 TABLET ORAL at 09:41

## 2021-02-04 RX ADMIN — ALBUTEROL SULFATE 2 PUFF: 90 AEROSOL, METERED RESPIRATORY (INHALATION) at 16:41

## 2021-02-04 RX ADMIN — ALBUTEROL SULFATE 2 PUFF: 90 AEROSOL, METERED RESPIRATORY (INHALATION) at 06:08

## 2021-02-04 RX ADMIN — PANTOPRAZOLE SODIUM 40 MG: 40 TABLET, DELAYED RELEASE ORAL at 05:59

## 2021-02-04 RX ADMIN — FAMOTIDINE 20 MG: 20 TABLET ORAL at 09:41

## 2021-02-04 RX ADMIN — AMLODIPINE BESYLATE 5 MG: 5 TABLET ORAL at 09:41

## 2021-02-04 RX ADMIN — ACETAMINOPHEN 650 MG: 325 TABLET ORAL at 16:40

## 2021-02-04 ASSESSMENT — PAIN SCALES - GENERAL
PAINLEVEL_OUTOF10: 0
PAINLEVEL_OUTOF10: 0

## 2021-02-04 NOTE — CARE COORDINATION
Care Coordination: Received message from nursing to speak to daughter Sabiha Church at  and also message from patients  as he would like to set up 24/7 care. I spoke to Lashonda, she feels patient is in early undiagnosed state of dementia. She would like mother to have hhc as ordered but would also like her to have ptx and otx, with [facility].  Her brother Maday Langston lives local at   She lives in [facility]. His brother does not feel 24/7 care is yet needed at home but he does have a list that was provided to him if needed and she states her parents can afford this if felt needed after discharge. But for now she would like [facility] for [email or web address], Med compliance, physical therapy and occupational therapy to evaluate. Brother will likely  upon discharge but if not [facility]  She asked to check with brother for dc transportation    [facility]: Zahraa Settler from [address] notified of orders in and tentative dc for today    Nimisha Gonzalez

## 2021-02-04 NOTE — PROGRESS NOTES
Subjective:  Feeling better   No CP or SOB  No fever or chills   No uncontrolled pain  No vomiting or diarrhea     Objective:    /86   Pulse 84   Temp 98 °F (36.7 °C) (Oral)   Resp 18   Ht 5' 3\" (1.6 m)   Wt 121 lb 0.6 oz (54.9 kg)   SpO2 94%   BMI 21.44 kg/m²     24HR INTAKE/OUTPUT:      Intake/Output Summary (Last 24 hours) at 2/4/2021 0843  Last data filed at 2/4/2021 7049  Gross per 24 hour   Intake 480 ml   Output 1600 ml   Net -1120 ml       General appearance: NAD, conversant  Neck: FROM, supple   Lungs: Clear bilaterally no wheezes, no rhonchi, no crackles  CV: RRR, no MRGs; normal carotid upstroke and amplitude without Bruits  Abdomen: Soft, non-tender; no masses or HSM  Extremities: No edema, no cyanosis, no clubbing  Skin: Intact no rash, no lesions, no ulcers    Psych: Alert and oriented normal affect  Neuro: Nonfocal  Most Recent Labs  Lab Results   Component Value Date    WBC 5.7 02/04/2021    HGB 13.1 02/04/2021    HCT 39.6 02/04/2021     02/04/2021     (H) 02/04/2021    K 3.2 (L) 02/04/2021     (H) 02/04/2021    CREATININE 0.6 02/04/2021    BUN 18 02/04/2021    CO2 26 02/04/2021    GLUCOSE 102 (H) 02/04/2021    ALT 11 02/04/2021    AST 18 02/04/2021    LABA1C 5.9 (H) 02/02/2021     Recent Labs     02/02/21  1027   MG 2.0     Lab Results   Component Value Date    CALCIUM 8.5 (L) 02/04/2021        CTA PULMONARY W CONTRAST   Final Result   1. No pulmonary embolism. 2. Bilateral pulmonary infiltrates with an appearance consistent with   patient's history of COVID-19.   3. Cardiomegaly. CT HEAD WO CONTRAST   Final Result   No acute intracranial abnormality. XR CHEST PORTABLE   Final Result   1. Multifocal bilateral patchy pulmonary infiltrates.           Assessment    Principal Problem:    Pneumonia due to COVID-19 virus  Active Problems:    Chest pain    Essential hypertension    Hyperlipidemia LDL goal <100    Gastroesophageal reflux disease without esophagitis    Hypokalemia    Cardiomegaly    Metabolic encephalopathy  Resolved Problems:    * No resolved hospital problems.  *      Plan:  51-year-old female history of hypertension, hyperlipidemia, GERD admitted with Covid 19--patient had first Covid vaccination 2 weeks ago     SARS-CoV-2 +   Isolation--droplet plus  Supplemental O2 wean as tolerated  MDIs, no nebulizers  Incentive spirometer  Encourage proning as needed  Remdesivir  Decadron  Melatonin, Pepcid, Zinc, vitamin C, vitamin D  Monitor inflammatory markers  Medications for other co morbidities cont as appropriate w dosage adjustments as necessary  PT/OT  DVT PPx  DC planning discharge home health care today    Electronically signed by Aracelis Joe MD on 2/4/2021 at 8:43 AM

## 2021-02-04 NOTE — DISCHARGE SUMMARY
Physician Discharge Summary     Patient ID:  Paul Little  [id number]  72 y.o.  10/12/1932    Admit date: 2/1/2021    Discharge date and time:  2/4/2021    Discharge Diagnoses: Principal Problem:    Pneumonia due to COVID-19 virus  Active Problems:    Chest pain    Essential hypertension    Hyperlipidemia LDL goal <100    Gastroesophageal reflux disease without esophagitis    Hypokalemia    Cardiomegaly    Metabolic encephalopathy  Resolved Problems:    * No resolved hospital problems.  *      Consults: IP CONSULT TO INTERNAL MEDICINE  IP CONSULT TO SOCIAL WORK  IP CONSULT TO CASE MANAGEMENT  IP CONSULT TO PHARMACY  IP CONSULT TO HOME CARE NEEDS    Procedures: See below    Hospital Course: 66-year-old female history of hypertension, hyperlipidemia, GERD admitted with Covid 19--patient had first Covid vaccination 2 weeks ago     SARS-CoV-2 +   Isolation--droplet plus  Supplemental O2 wean as tolerated  MDIs, no nebulizers  Incentive spirometer  Encourage proning as needed  Remdesivir  Decadron  Melatonin, Pepcid, Zinc, vitamin C, vitamin D  Monitor inflammatory markers  Medications for other co morbidities cont as appropriate w dosage adjustments as necessary  PT/OT  DVT PPx  DC planning discharge home health care today      Discharge Exam:  See progress note from today    Condition:  Stable    Disposition: home    Patient Instructions:   Current Discharge Medication List      START taking these medications    Details   albuterol sulfate  (90 Base) MCG/ACT inhaler Inhale 2 puffs into the lungs every 6 hours  Qty: 1 Inhaler, Refills: 3      melatonin 5 MG TBDP disintegrating tablet Take 2 tablets by mouth nightly  Qty: 15 tablet, Refills: 0      zinc sulfate (ZINCATE) 220 (50 Zn) MG capsule Take 1 capsule by mouth daily for 15 days  Qty: 15 capsule, Refills: 0      dexamethasone (DECADRON) 6 MG tablet Take 1 tablet by mouth daily (with breakfast) for 5 days  Qty: 5 tablet, Refills: 0      ondansetron (ZOFRAN) 4 MG tablet Take 1 tablet by mouth 3 times daily as needed for Nausea or Vomiting  Qty: 15 tablet, Refills: 0         CONTINUE these medications which have NOT CHANGED    Details   omeprazole (PRILOSEC) 20 MG delayed release capsule Take 20 mg by mouth daily as needed (gerd)      busPIRone (BUSPAR) 10 MG tablet Take 10 mg by mouth daily as needed (anxiety)       atorvastatin (LIPITOR) 20 MG tablet Take 20 mg by mouth daily       amLODIPine (NORVASC) 5 MG tablet Take 5 mg by mouth daily       acetaminophen (TYLENOL) 500 MG tablet Take 1,000 mg by mouth as needed         STOP taking these medications       predniSONE (DELTASONE) 10 MG tablet Comments:   Reason for Stopping:         ibuprofen (ADVIL;MOTRIN) 800 MG tablet Comments:   Reason for Stopping:         Dextromethorphan-Guaifenesin (MUCINEX DM MAXIMUM STRENGTH)  MG TB12 Comments:   Reason for Stopping:         hydrochlorothiazide (HYDRODIURIL) 25 MG tablet Comments:   Reason for Stopping:         olmesartan (BENICAR) 40 MG tablet Comments:   Reason for Stopping:         Ezetimibe (ZETIA PO) Comments:   Reason for Stopping:         Esomeprazole Magnesium (NEXIUM PO) Comments:   Reason for Stopping:             Activity: activity as tolerated  Diet: regular diet    Follow-up with 1wk PCP virtual vist    Note that over 30 minutes was spent in preparing discharge papers, discussing discharge with patient, staff, consultants, medication review, arranging follow up, etc.    Signed:  Mai Liu MD  2/4/2021  1:25 PM

## 2021-02-04 NOTE — PROGRESS NOTES
Discharge instructions and education provided to pt's daughter-in-law at time of . All questions answered, verbalizes understanding. Discharge paperwork and belongings sent with pt.

## 2021-02-04 NOTE — PLAN OF CARE
Problem: Airway Clearance - Ineffective  Goal: Achieve or maintain patent airway  Outcome: Met This Shift     Problem: Gas Exchange - Impaired  Goal: Absence of hypoxia  Outcome: Met This Shift  Goal: Promote optimal lung function  Outcome: Met This Shift     Problem: Breathing Pattern - Ineffective  Goal: Ability to achieve and maintain a regular respiratory rate  Outcome: Met This Shift     Problem:  Body Temperature -  Risk of, Imbalanced  Goal: Ability to maintain a body temperature within defined limits  Outcome: Met This Shift  Goal: Will regain or maintain usual level of consciousness  Outcome: Met This Shift  Goal: Complications related to the disease process, condition or treatment will be avoided or minimized  Outcome: Met This Shift     Problem: Isolation Precautions - Risk of Spread of Infection  Goal: Prevent transmission of infection  Outcome: Met This Shift     Problem: Nutrition Deficits  Goal: Optimize nutritional status  Outcome: Met This Shift     Problem: Risk for Fluid Volume Deficit  Goal: Maintain normal heart rhythm  Outcome: Met This Shift  Goal: Maintain absence of muscle cramping  Outcome: Met This Shift  Goal: Maintain normal serum potassium, sodium, calcium, phosphorus, and pH  Outcome: Met This Shift     Problem: Loneliness or Risk for Loneliness  Goal: Demonstrate positive use of time alone when socialization is not possible  Outcome: Met This Shift     Problem: Fatigue  Goal: Verbalize increase energy and improved vitality  Outcome: Met This Shift     Problem: Patient Education: Go to Patient Education Activity  Goal: Patient/Family Education  Outcome: Met This Shift     Problem: Pain:  Goal: Pain level will decrease  Description: Pain level will decrease  Outcome: Met This Shift  Goal: Control of acute pain  Description: Control of acute pain  Outcome: Met This Shift  Goal: Control of chronic pain  Description: Control of chronic pain  Outcome: Met This Shift     Problem: Falls -

## 2021-02-04 NOTE — PROGRESS NOTES
Physical Therapy    Medical chart reviewed for PT treatment 2/4 PM. Pt declined PT treatment stating \"I just got back into bed. Maybe later\". Will re-attempt at a later date. Thank you.     Jerica Jimenez, PT, DPT  [id number]

## 2021-02-05 ENCOUNTER — CARE COORDINATION (OUTPATIENT)
Dept: CASE MANAGEMENT | Age: 86
End: 2021-02-05

## 2021-02-05 NOTE — CARE COORDINATION
[facility] Transitions Initial Follow Up Call    Call within 2 business days of discharge: Yes    Patient: Micaela Lin Patient : 10/12/1932   MRN: [id number]  Reason for Admission: pneumonia due to covid-19 virus  Discharge Date: 21 RARS: Readmission Risk Score: 17      Last Discharge [facility]       Complaint Diagnosis Description Type Department Provider    21 Fatigue; Altered Mental Status Acute respiratory failure with hypoxia (HCC) . .. ED to Hosp-Admission (Discharged) (ADMITTED) Dirk Echols MD; Shaka Carballo. ..        -First attempt to reach the patient for initial BPCI call post hospital discharge. Message left with CTN's contact information requesting return phone call.     Non-face-to-face services provided:  Communication with home health agencies or other community services the patient is currently using-visit scheduled for today(21)per [facility]        Follow Up  Future Appointments   Date Time Provider Shira Easley   2021  3:30 PM SCHEDULE, 5 [facility] & [employer] [facility]       David Viramontes RN

## 2021-02-06 LAB
EKG ATRIAL RATE: 74 BPM
EKG P AXIS: 50 DEGREES
EKG P-R INTERVAL: 152 MS
EKG Q-T INTERVAL: 366 MS
EKG QRS DURATION: 80 MS
EKG QTC CALCULATION (BAZETT): 406 MS
EKG R AXIS: 1 DEGREES
EKG T AXIS: 86 DEGREES
EKG VENTRICULAR RATE: 74 BPM

## 2021-02-06 PROCEDURE — 93010 ELECTROCARDIOGRAM REPORT: CPT | Performed by: INTERNAL MEDICINE

## 2021-02-08 ENCOUNTER — CARE COORDINATION (OUTPATIENT)
Dept: CASE MANAGEMENT | Age: 86
End: 2021-02-08

## 2021-02-08 NOTE — CARE COORDINATION
[facility] Transitions Initial Follow Up Call    Call within 2 business days of discharge: Yes    Patient: Elizabeth Chavez Patient : 10/12/1932   MRN: [id number]  Reason for Admission: pneumonia d/t covid-19 virus  Discharge Date: 21 RARS: Readmission Risk Score: 17      Last Discharge [id number] [address]       Complaint Diagnosis Description Type Department Provider    21 Fatigue; Altered Mental Status Acute respiratory failure with hypoxia (HCC) . .. ED to Hosp-Admission (Discharged) (ADMITTED) Enrique Luna MD; Elle Bateman. .. Patient contacted regarding COVID-19 diagnosis. Discussed COVID-19 related testing which was available at this time. Test results were positive. Patient informed of results, if available? Already aware    Care Transition Nurse/ Ambulatory Care Manager contacted the patient by telephone to perform post discharge assessment. Call within 2 business days of discharge: Yes. Verified name and  with patient as identifiers. Provided introduction to self, and explanation of the CTN/ACM role, and reason for call due to risk factors for infection and/or exposure to COVID-19. Symptoms reviewed with patient who verbalized the following symptoms:  Patient having no symptoms, states she is \"feeling fine,\" eating/drinking/sleeping well. Due to no new or worsening symptoms encounter was not routed to provider for escalation. Discussed follow-up appointments. If no appointment was previously scheduled, appointment scheduling offered: patient to call today. [facility] follow up appointment(s): No future appointments. Non-[facility] follow up appointment(s): none    Non-face-to-face services provided:  Obtained and reviewed discharge summary and/or continuity of care documents     Advance Care Planning:   Does patient have an Advance Directive:  decision maker updated. Patient has following risk factors of: pneumonia and HTN. . CTN/ACM reviewed discharge instructions, medical action plan and red flags such as increased shortness of breath, increasing fever and signs of decompensation with patient who verbalized understanding. Discussed exposure protocols and quarantine with CDC Guidelines What to do if you are sick with coronavirus disease 2019.  Patient was given an opportunity for questions and concerns. The patient agrees to contact the [facility] exposure line [phone], local [facility] department [place] [facility]: ([phone]) and PCP office for questions related to their healthcare. CTN/ACM provided contact information for future needs. Reviewed and educated patient on any new and changed medications related to discharge diagnosis. Full med review completed, 1111F not entered as non [facility] PCP. Patient was unaware of new medications ordered at discharge as per AVS.  CTN informed patient the albuterol inhaler and zofran were e-scribed to her pharmacy and should be available. CTN informed patient that melatonin and zinc can be obtained OTC. Patient denies any nausea or problems sleeping so she feels zofran and melatonin are unnecessary. Patient also denies any issues with her breathing or any SOB. CTN reviewed with patient stopped medication of prednisone as per AVS.        Care Transitions 24 Hour Call    Do you have any ongoing symptoms?: No  Do you have a copy of your discharge instructions?: Yes  Do you have all of your prescriptions and are they filled?: No  Have you been contacted by a [address]?: No  Have you scheduled your follow up appointment?: No  Were you discharged with any Home Care or Post Acute Services: Yes  Post Acute Services: Home Health (Comment: 2/8/21-[facility] [address])  Do you feel like you have everything you need to keep you well at home?: Yes  Care Transitions Interventions  No Identified Needs       -Spoke with patient for initial BPCI care transition call post hospital discharge.  Explained the role of Care Transition Nurse and the BPCI-A program, patient is agreeable to follow up post discharge from the hospital.   -[facility] visited this past Friday(2/5/21)and will be returning tomorrow(2/9/21.)  -CTN explained that a member of the Care Transition Central Team will be contacting her for further follow up calls, patient is in agreement and denies any other needs or concerns at this time. -CTN will hand off to the Care Transition Central team to follow. Follow Up  No future appointments.     Kimberly Richmond RN

## 2021-02-15 ENCOUNTER — CARE COORDINATION (OUTPATIENT)
Dept: CASE MANAGEMENT | Age: 86
End: 2021-02-15

## 2021-02-18 ENCOUNTER — PROCEDURE VISIT (OUTPATIENT)
Dept: AUDIOLOGY | Age: 86
End: 2021-02-18

## 2021-02-18 DIAGNOSIS — H90.3 SENSORINEURAL HEARING LOSS, BILATERAL: ICD-10-CM

## 2021-02-18 PROCEDURE — V5160 DISPENSING FEE BINAURAL: HCPCS | Performed by: AUDIOLOGIST

## 2021-02-18 PROCEDURE — V5253 HEARING AID, PROG, BIN, BTE: HCPCS | Performed by: AUDIOLOGIST

## 2021-02-22 ENCOUNTER — CARE COORDINATION (OUTPATIENT)
Dept: CASE MANAGEMENT | Age: 86
End: 2021-02-22

## 2021-02-22 NOTE — CARE COORDINATION
[facility] Transitions Follow Up Call    2021    Patient: Barbara Weaver  Patient : 10/12/1932   MRN: [id number]  Reason for Admission:   Discharge Date: 21 RARS: Readmission Risk Score: 17         Spoke with: Patient, Blair Lawler Transitions Subsequent and Final Call    Subsequent and Final Calls  Do you have any ongoing symptoms?: No  Do you have any questions related to your medications?: No  Do you currently have any active services?: Yes  Are you currently active with any services?: Home Health  -services continue  Do you have any needs or concerns that I can assist you with?: No  Identified Barriers: None  Care Transitions Interventions  No Identified Needs    Patient is pleasant in conversation and reports, \"I\"m fine\"l   -denies fever, chills, shortness of breath, or chest discomfort   -reports good appetite with normal bladder/bowel elimination   -ambulates independently; denies recent fall     Emotional support provided; will continue to follow.        Follow Up  Future Appointments   Date Time Provider Shira Easley   3/9/2021  2:45 PM SCHEDULE, [address] [place] [address] & [employer] [facility] Audio Champ Tarango RN

## 2021-03-09 ENCOUNTER — CARE COORDINATION (OUTPATIENT)
Dept: CASE MANAGEMENT | Age: 86
End: 2021-03-09

## 2021-03-09 NOTE — CARE COORDINATION
[facility] Transitions Follow Up Call    3/9/2021    Patient: Merlinda Largo  Patient : 10/12/1932   MRN: <[id number]>  Reason for Admission:   Discharge Date: 21 RARS: Readmission Risk Score: 17    Follow Up: Attempted to contact patient for BPCI-A follow up. Unable to reach patient. Left message with contact information and request for call back.       Future Appointments   Date Time Provider Shira Easley   3/9/2021  2:45 PM Jaquan Mattson [facility]

## 2021-03-16 ENCOUNTER — CARE COORDINATION (OUTPATIENT)
Dept: CASE MANAGEMENT | Age: 86
End: 2021-03-16

## 2021-03-16 NOTE — CARE COORDINATION
[facility] Transitions Follow Up Call    3/16/2021    Patient: Kenzie Sawyer  Patient : 10/12/1932   MRN: [id number]  Reason for Admission:   Discharge Date: 21 RARS: Readmission Risk Score: 17         Spoke with: Patient, Claudia Dickens Transitions Subsequent and Final Call    Subsequent and Final Calls  Do you have any ongoing symptoms?: No  Do you have any questions related to your medications?: No  Are you currently active with any services?: Home Health  -services completed   Do you have any needs or concerns that I can assist you with?: No  Identified Barriers: None  Care Transitions Interventions  No Identified Needs      Patient is pleasant in conversation and reports she is doing well.  -denies fever, chills, shortness of breath, or chest discomfort   -reports good appetite with normal bladder/bowel elimination   -ambulates independently; denies recent fall     Emotional support provided; patient denies need for further phone calls. Follow Up  No future appointments.     Rosanne Birch RN

## 2021-04-14 ENCOUNTER — PROCEDURE VISIT (OUTPATIENT)
Dept: AUDIOLOGY | Age: 86
End: 2021-04-14

## 2021-04-14 DIAGNOSIS — H90.3 SENSORINEURAL HEARING LOSS, BILATERAL: ICD-10-CM

## 2021-04-14 PROCEDURE — 99999 PR OFFICE/OUTPT VISIT,PROCEDURE ONLY: CPT | Performed by: AUDIOLOGIST

## 2021-04-14 NOTE — PROGRESS NOTES
Patient seen for hearing aid cleaning/check. No significant issues noted. Patient encouraged to wear her hearing aids more often, so that she can become more used to them. Patient to return in 2 months.

## 2021-05-05 ENCOUNTER — CARE COORDINATION (OUTPATIENT)
Dept: CASE MANAGEMENT | Age: 86
End: 2021-05-05

## 2021-05-05 NOTE — CARE COORDINATION
[facility] Transitions Follow Up Call    2021    Patient: Ravindra Mejia  Patient : 10/12/1932   MRN: <[id number]>  Reason for Admission:   Discharge Date: 21 RARS: Readmission Risk Score: 17       BPCI-A bundle ending. No outreach made d/t patient declining follow up calls as of 3/16/21. CTN signing off.       Ashley Shields RN

## 2022-07-01 ENCOUNTER — APPOINTMENT (OUTPATIENT)
Dept: GENERAL RADIOLOGY | Age: 87
End: 2022-07-01
Payer: MEDICARE

## 2022-07-01 ENCOUNTER — HOSPITAL ENCOUNTER (EMERGENCY)
Age: 87
Discharge: HOME | End: 2022-07-01
Attending: EMERGENCY MEDICINE
Payer: MEDICARE

## 2022-07-01 VITALS
OXYGEN SATURATION: 97 % | SYSTOLIC BLOOD PRESSURE: 155 MMHG | DIASTOLIC BLOOD PRESSURE: 68 MMHG | HEART RATE: 57 BPM | RESPIRATION RATE: 18 BRPM | TEMPERATURE: 97.7 F

## 2022-07-01 DIAGNOSIS — S92.322A CLOSED DISPLACED FRACTURE OF SECOND METATARSAL BONE OF LEFT FOOT, INITIAL ENCOUNTER: Primary | ICD-10-CM

## 2022-07-01 PROCEDURE — 99283 EMERGENCY DEPT VISIT LOW MDM: CPT

## 2022-07-01 PROCEDURE — 73630 X-RAY EXAM OF FOOT: CPT

## 2022-07-01 ASSESSMENT — PAIN SCALES - GENERAL: PAINLEVEL_OUTOF10: 9

## 2022-07-01 ASSESSMENT — ENCOUNTER SYMPTOMS
RHINORRHEA: 0
BACK PAIN: 0
SHORTNESS OF BREATH: 0
VOMITING: 0
COLOR CHANGE: 0
NAUSEA: 0
BLOOD IN STOOL: 0
COUGH: 0
ABDOMINAL PAIN: 0

## 2022-07-01 ASSESSMENT — PAIN DESCRIPTION - FREQUENCY: FREQUENCY: CONTINUOUS

## 2022-07-01 ASSESSMENT — PAIN DESCRIPTION - ORIENTATION: ORIENTATION: LEFT

## 2022-07-01 ASSESSMENT — PAIN - FUNCTIONAL ASSESSMENT: PAIN_FUNCTIONAL_ASSESSMENT: 0-10

## 2022-07-01 ASSESSMENT — PAIN DESCRIPTION - LOCATION: LOCATION: FOOT

## 2022-07-01 ASSESSMENT — PAIN DESCRIPTION - DESCRIPTORS: DESCRIPTORS: DISCOMFORT

## 2022-07-01 ASSESSMENT — PAIN DESCRIPTION - PAIN TYPE: TYPE: ACUTE PAIN

## 2022-07-01 NOTE — ED NOTES
Discharge instructions given, patient verbalized their understanding, no other noted or stated problems at this time. Patient will follow up with primary doctor for care.      Mayito Cedillo RN  07/01/22 1609

## 2022-07-01 NOTE — ED PROVIDER NOTES
ED PROVIDER NOTE    Chief Complaint   Patient presents with    Foot Pain     left foot pain, swollen. thinks she twisted it sent in from urgent care to get US       HPI:  7/1/22,   Time: 12:44 PM EDT       Daryn Schwab is a 80 y.o. female presenting to the ED for left foot pain. Gradual onset yesterday, persistent since onset, moderate in severity. 2 days ago was working in the garden and stepped wrong on the left foot. Since then gradual onset pain over dorsum of left foot, worse w/ movement and walking. No associated numbness or weakness in the leg or foot. No associated thigh or calf pain. Went to urgent care today and was sent to ED in case she were to need an ultrasound to r/o DVT. No fever, chills, cough, chest pain, shortness of breath, abdominal pain, nausea, vomiting. No known hx of VTE. Chart review: hx HTN, HLD    Review of Systems:     Review of Systems   Constitutional: Negative for appetite change, chills and fever. HENT: Negative for congestion and rhinorrhea. Eyes: Negative for visual disturbance. Respiratory: Negative for cough and shortness of breath. Cardiovascular: Negative for chest pain. Gastrointestinal: Negative for abdominal pain, blood in stool, nausea and vomiting. Genitourinary: Negative for decreased urine volume and difficulty urinating. Musculoskeletal: Positive for arthralgias and joint swelling. Negative for back pain and neck pain. Skin: Negative for color change.    Neurological: Negative for dizziness, syncope, weakness, light-headedness, numbness and headaches.         --------------------------------------------- PAST HISTORY ---------------------------------------------  Past Medical History:   Past Medical History:   Diagnosis Date    GERD (gastroesophageal reflux disease)     Hyperlipidemia     Hypertension        Past Surgical History:   Past Surgical History:   Procedure Laterality Date    APPENDECTOMY      CHOLECYSTECTOMY      COLONOSCOPY 9/12/13    HYSTERECTOMY (CERVIX STATUS UNKNOWN)         Social History:   Social History     Socioeconomic History    Marital status:      Spouse name: None    Number of children: None    Years of education: None    Highest education level: None   Occupational History    None   Tobacco Use    Smoking status: Never Smoker    Smokeless tobacco: Never Used   Substance and Sexual Activity    Alcohol use: Yes     Comment: social    Drug use: No    Sexual activity: Never   Other Topics Concern    None   Social History Narrative    None     Social Determinants of Health     Financial Resource Strain:     Difficulty of Paying Living Expenses: Not on file   Food Insecurity:     Worried About Running Out of Food in the Last Year: Not on file    Haley of Food in the Last Year: Not on file   Transportation Needs:     Lack of Transportation (Medical): Not on file    Lack of Transportation (Non-Medical): Not on file   Physical Activity:     Days of Exercise per Week: Not on file    Minutes of Exercise per Session: Not on file   Stress:     Feeling of Stress : Not on file   Social Connections:     Frequency of Communication with Friends and Family: Not on file    Frequency of Social Gatherings with Friends and Family: Not on file    Attends Mu-ism Services: Not on file    Active Member of [address] [place] [address] or Organizations: Not on file    Attends Club or Organization Meetings: Not on file    Marital Status: Not on file   Intimate Partner Violence:     Fear of Current or Ex-Partner: Not on file    Emotionally Abused: Not on file    Physically Abused: Not on file    Sexually Abused: Not on file   Housing Stability:     Unable to Pay for Housing in the Last Year: Not on file    Number of [place] in the Last Year: Not on file    Unstable Housing in the Last Year: Not on file       Family History:   History reviewed. No pertinent family history.     The patients home medications have been reviewed. Allergies:   No Known Allergies        ---------------------------------------------------PHYSICAL EXAM--------------------------------------    BP (!) 155/68   Pulse 57   Temp 97.7 °F (36.5 °C)   Resp 18   SpO2 97%     Physical Exam  Constitutional:       General: She is not in acute distress. Appearance: She is not toxic-appearing. HENT:      Mouth/Throat:      Mouth: Mucous membranes are moist.   Eyes:      General: No scleral icterus. Extraocular Movements: Extraocular movements intact. Pupils: Pupils are equal, round, and reactive to light. Cardiovascular:      Rate and Rhythm: Normal rate and regular rhythm. Pulses: Normal pulses. Heart sounds: Normal heart sounds. No murmur heard. Pulmonary:      Effort: Pulmonary effort is normal. No respiratory distress. Breath sounds: Normal breath sounds. No wheezing or rales. Abdominal:      General: There is no distension. Palpations: Abdomen is soft. Tenderness: There is no abdominal tenderness. Musculoskeletal:         General: No swelling or tenderness. Normal range of motion. Cervical back: Normal range of motion and neck supple. Comments: LLE 2+ DP/PT pulses. Localized swelling and ttp over dorsal aspect of 3rd-5th metatarsals. No calf or thigh tenderness. No leg or ankle edema. 5/5 HF/KE/DF/PF. SILT throughout. Skin:     General: Skin is warm and dry. Neurological:      Mental Status: She is alert and oriented to person, place, and time. Comments: Strength 5/5 and sensation grossly intact to light touch and equal bilaterally throughout all extremities             -------------------------------------------------- RESULTS -------------------------------------------------  I have personally reviewed all laboratory and imaging results for this patient. Results are listed below.        RADIOLOGY:  Interpreted personally and by Radiologist.  XR FOOT LEFT (MIN 3 VIEWS)   Final Result Oblique fracture of 2nd metatarsal.               ------------------------- NURSING NOTES AND VITALS REVIEWED ---------------------------   The nursing notes within the ED encounter and vital signs as below have been reviewed by myself. BP (!) 155/68   Pulse 57   Temp 97.7 °F (36.5 °C)   Resp 18   SpO2 97%   Oxygen Saturation Interpretation: Normal    The patients available past medical records and past encounters were reviewed. ------------------------------ ED COURSE/MEDICAL DECISION MAKING----------------------      Counseling: The emergency provider has spoken with the patient and discussed todays results, in addition to providing specific details for the plan of care and counseling regarding the diagnosis and prognosis. Questions are answered at this time and they are agreeable with the plan. ED Course/Medical Decision Makin y.o. female here with left foot pain and swelling s/p mechanical injury 2d ago. Well appearing and in no acute distress. LLE neurovascularly intact. Pain well controlled, declined pain medication in the ED. Plain films show oblique fracture of 2nd metatarsal of left foot. Placed in postop shoe which patient preferred rather than walking boot. After discussion of findings and return precautions, patient agrees with plan for discharge and outpatient follow up with podiatry and PCP. Rx WBAT, RICE, tylenol/NSAIDs for pain as needed. --------------------------------- IMPRESSION AND DISPOSITION ---------------------------------    IMPRESSION  1. Closed displaced fracture of second metatarsal bone of left foot, initial encounter        DISPOSITION  Disposition: Discharge to home  Patient condition is good    NOTE: This report was transcribed using voice recognition software.  Every effort was made to ensure accuracy; however, inadvertent computerized transcription errors may be present    Demetrio Espino MD  Attending Emergency Physician         Demetrio Espino, MD  07/01/22 [id number]

## 2022-10-27 ENCOUNTER — PROCEDURE VISIT (OUTPATIENT)
Dept: AUDIOLOGY | Age: 87
End: 2022-10-27

## 2022-10-27 DIAGNOSIS — H90.3 SENSORINEURAL HEARING LOSS, BILATERAL: Primary | ICD-10-CM

## 2022-10-27 PROCEDURE — 99024 POSTOP FOLLOW-UP VISIT: CPT | Performed by: AUDIOLOGIST

## 2022-10-27 NOTE — PROGRESS NOTES
Patient seen for hearing aid check/cleaning. VC activated on HAs per patient request.    Patient to return in 6 months.     Rosio Delgado CCC/NEGRO  Audiologist  [id number]  NPI#:  [id number]

## 2023-02-13 LAB
AO ROOT DIAM: NORMAL
AV MG: NORMAL
AV V1 MAX: NORMAL
AV V2 MAX: NORMAL
AVA DOPPLER: NORMAL
ECHO AV PEAK GRADIENT: NORMAL
ECHO AV VTI: NORMAL
ECHO LV INTERNAL DIMENSION DIASTOLIC: NORMAL
ECHO LV INTERNAL DIMENSION SYSTOLIC: NORMAL
ECHO LV POSTERIOR WALL DIASTOLIC: NORMAL
ECHO LVOT DIAM: NORMAL
ECHO MV A VELOCITY: NORMAL
ECHO MV AREA PHT: NORMAL
ECHO MV E VELOCITY: NORMAL
ECHO MV E/A RATIO: NORMAL
ECHO MV MEAN GRADIENT: NORMAL
ECHO PVEIN S/D RATIO: NORMAL
ECHO RV INTERNAL DIMENSION: NORMAL
IVC-%COLLAPSE: NORMAL
IVC: NORMAL
IVSD (M-MODE): NORMAL
IVSD: NORMAL
LAD 2D: NORMAL
LAD M-MODE: NORMAL
LEFT VENTRICULAR EJECTION FRACTION MODE: NORMAL
LV EF: 65 %
LV MASS (M-MODE): NORMAL
LV MASS/BSA: NORMAL
LVIDD (M-MODE): NORMAL
LVIDS (M-MODE): NORMAL
LVPWD (M-MODE): NORMAL
LVPWD 2D: NORMAL
LVSV: NORMAL
MR VELOCITY: NORMAL
MR VTI: NORMAL
MV A POINT: NORMAL
MV D: NORMAL
MV DT: NORMAL
MV EPSS: NORMAL
MV PG: NORMAL
MV PHT: NORMAL
MV VTI  V2: NORMAL
MVA: NORMAL
PA DIAST PRESS: NORMAL
PISA MR VOL: NORMAL
PISA MV REG ALIAS VEL: NORMAL
RAP: NORMAL
RVIDD M-MODE: NORMAL
RVSP ESTIMATE: NORMAL
TV RPFV: NORMAL

## 2023-11-17 ENCOUNTER — HOSPITAL ENCOUNTER (INPATIENT)
Age: 88
LOS: 2 days | Discharge: HOME | DRG: 392 | End: 2023-11-19
Attending: STUDENT IN AN ORGANIZED HEALTH CARE EDUCATION/TRAINING PROGRAM | Admitting: INTERNAL MEDICINE
Payer: MEDICARE

## 2023-11-17 ENCOUNTER — APPOINTMENT (OUTPATIENT)
Dept: CT IMAGING | Age: 88
DRG: 392 | End: 2023-11-17
Payer: MEDICARE

## 2023-11-17 ENCOUNTER — APPOINTMENT (OUTPATIENT)
Dept: ULTRASOUND IMAGING | Age: 88
DRG: 392 | End: 2023-11-17
Payer: MEDICARE

## 2023-11-17 DIAGNOSIS — K76.9 LIVER LESION: ICD-10-CM

## 2023-11-17 DIAGNOSIS — R10.9 INTRACTABLE ABDOMINAL PAIN: Primary | ICD-10-CM

## 2023-11-17 LAB
ALBUMIN SERPL-MCNC: 3.1 G/DL (ref 3.5–5.2)
ALP SERPL-CCNC: 44 U/L (ref 35–104)
ALT SERPL-CCNC: 7 U/L (ref 0–32)
ANION GAP SERPL CALCULATED.3IONS-SCNC: 11 MMOL/L (ref 7–16)
AST SERPL-CCNC: 12 U/L (ref 0–31)
BACTERIA URNS QL MICRO: ABNORMAL
BASOPHILS # BLD: 0.04 K/UL (ref 0–0.2)
BASOPHILS NFR BLD: 1 % (ref 0–2)
BILIRUB SERPL-MCNC: 0.4 MG/DL (ref 0–1.2)
BILIRUB UR QL STRIP: NEGATIVE
BUN SERPL-MCNC: 12 MG/DL (ref 6–23)
CALCIUM SERPL-MCNC: 8.7 MG/DL (ref 8.6–10.2)
CHLORIDE SERPL-SCNC: 102 MMOL/L (ref 98–107)
CLARITY UR: CLEAR
CO2 SERPL-SCNC: 26 MMOL/L (ref 22–29)
COLOR UR: YELLOW
CREAT SERPL-MCNC: 0.6 MG/DL (ref 0.5–1)
EOSINOPHIL # BLD: 0.03 K/UL (ref 0.05–0.5)
EOSINOPHILS RELATIVE PERCENT: 0 % (ref 0–6)
ERYTHROCYTE [DISTWIDTH] IN BLOOD BY AUTOMATED COUNT: 11.8 % (ref 11.5–15)
GFR SERPL CREATININE-BSD FRML MDRD: >60 ML/MIN/1.73M2
GLUCOSE SERPL-MCNC: 103 MG/DL (ref 74–99)
GLUCOSE UR STRIP-MCNC: NEGATIVE MG/DL
HCT VFR BLD AUTO: 33.3 % (ref 34–48)
HGB BLD-MCNC: 10.8 G/DL (ref 11.5–15.5)
HGB UR QL STRIP.AUTO: NEGATIVE
IMM GRANULOCYTES # BLD AUTO: <0.03 K/UL (ref 0–0.58)
IMM GRANULOCYTES NFR BLD: 0 % (ref 0–5)
KETONES UR STRIP-MCNC: NEGATIVE MG/DL
LACTATE BLDV-SCNC: 0.8 MMOL/L (ref 0.5–2.2)
LEUKOCYTE ESTERASE UR QL STRIP: NEGATIVE
LIPASE SERPL-CCNC: 15 U/L (ref 13–60)
LYMPHOCYTES NFR BLD: 1.6 K/UL (ref 1.5–4)
LYMPHOCYTES RELATIVE PERCENT: 24 % (ref 20–42)
MAGNESIUM SERPL-MCNC: 1.7 MG/DL (ref 1.6–2.6)
MCH RBC QN AUTO: 29 PG (ref 26–35)
MCHC RBC AUTO-ENTMCNC: 32.4 G/DL (ref 32–34.5)
MCV RBC AUTO: 89.5 FL (ref 80–99.9)
MONOCYTES NFR BLD: 0.67 K/UL (ref 0.1–0.95)
MONOCYTES NFR BLD: 10 % (ref 2–12)
NEUTROPHILS NFR BLD: 65 % (ref 43–80)
NEUTS SEG NFR BLD: 4.4 K/UL (ref 1.8–7.3)
NITRITE UR QL STRIP: NEGATIVE
PH UR STRIP: 7 [PH] (ref 5–9)
PLATELET # BLD AUTO: 280 K/UL (ref 130–450)
PMV BLD AUTO: 9.5 FL (ref 7–12)
POTASSIUM SERPL-SCNC: 3.2 MMOL/L (ref 3.5–5)
PROT SERPL-MCNC: 6.2 G/DL (ref 6.4–8.3)
PROT UR STRIP-MCNC: NEGATIVE MG/DL
RBC # BLD AUTO: 3.72 M/UL (ref 3.5–5.5)
RBC #/AREA URNS HPF: ABNORMAL /HPF
SODIUM SERPL-SCNC: 139 MMOL/L (ref 132–146)
SP GR UR STRIP: <1.005 (ref 1–1.03)
UROBILINOGEN UR STRIP-ACNC: 1 EU/DL (ref 0–1)
WBC #/AREA URNS HPF: ABNORMAL /HPF
WBC OTHER # BLD: 6.8 K/UL (ref 4.5–11.5)

## 2023-11-17 PROCEDURE — 85025 COMPLETE CBC W/AUTO DIFF WBC: CPT

## 2023-11-17 PROCEDURE — 99285 EMERGENCY DEPT VISIT HI MDM: CPT

## 2023-11-17 PROCEDURE — 6370000000 HC RX 637 (ALT 250 FOR IP): Performed by: STUDENT IN AN ORGANIZED HEALTH CARE EDUCATION/TRAINING PROGRAM

## 2023-11-17 PROCEDURE — 74177 CT ABD & PELVIS W/CONTRAST: CPT

## 2023-11-17 PROCEDURE — 2060000000 HC ICU INTERMEDIATE R&B

## 2023-11-17 PROCEDURE — 80053 COMPREHEN METABOLIC PANEL: CPT

## 2023-11-17 PROCEDURE — 6360000004 HC RX CONTRAST MEDICATION: Performed by: RADIOLOGY

## 2023-11-17 PROCEDURE — 81001 URINALYSIS AUTO W/SCOPE: CPT

## 2023-11-17 PROCEDURE — 6360000002 HC RX W HCPCS: Performed by: STUDENT IN AN ORGANIZED HEALTH CARE EDUCATION/TRAINING PROGRAM

## 2023-11-17 PROCEDURE — 87086 URINE CULTURE/COLONY COUNT: CPT

## 2023-11-17 PROCEDURE — 83605 ASSAY OF LACTIC ACID: CPT

## 2023-11-17 PROCEDURE — 6370000000 HC RX 637 (ALT 250 FOR IP): Performed by: INTERNAL MEDICINE

## 2023-11-17 PROCEDURE — 83690 ASSAY OF LIPASE: CPT

## 2023-11-17 PROCEDURE — 83735 ASSAY OF MAGNESIUM: CPT

## 2023-11-17 PROCEDURE — 96374 THER/PROPH/DIAG INJ IV PUSH: CPT

## 2023-11-17 PROCEDURE — 76705 ECHO EXAM OF ABDOMEN: CPT

## 2023-11-17 RX ORDER — ACETAMINOPHEN 650 MG/1
650 SUPPOSITORY RECTAL EVERY 6 HOURS PRN
Status: DISCONTINUED | OUTPATIENT
Start: 2023-11-17 | End: 2023-11-17

## 2023-11-17 RX ORDER — LIDOCAINE 50 MG/G
OINTMENT TOPICAL PRN
Status: DISCONTINUED | OUTPATIENT
Start: 2023-11-17 | End: 2023-11-19 | Stop reason: HOSPADM

## 2023-11-17 RX ORDER — POLYETHYLENE GLYCOL 3350 17 G/17G
17 POWDER, FOR SOLUTION ORAL DAILY PRN
Status: DISCONTINUED | OUTPATIENT
Start: 2023-11-17 | End: 2023-11-17

## 2023-11-17 RX ORDER — POTASSIUM CHLORIDE 20 MEQ/1
40 TABLET, EXTENDED RELEASE ORAL PRN
Status: DISCONTINUED | OUTPATIENT
Start: 2023-11-17 | End: 2023-11-17

## 2023-11-17 RX ORDER — IBUPROFEN 400 MG/1
400 TABLET ORAL EVERY 6 HOURS PRN
Status: ON HOLD | COMMUNITY
End: 2023-11-19 | Stop reason: HOSPADM

## 2023-11-17 RX ORDER — SODIUM CHLORIDE 0.9 % (FLUSH) 0.9 %
10 SYRINGE (ML) INJECTION PRN
Status: DISCONTINUED | OUTPATIENT
Start: 2023-11-17 | End: 2023-11-17

## 2023-11-17 RX ORDER — AMLODIPINE BESYLATE 5 MG/1
5 TABLET ORAL DAILY
Status: DISCONTINUED | OUTPATIENT
Start: 2023-11-17 | End: 2023-11-19 | Stop reason: HOSPADM

## 2023-11-17 RX ORDER — ESCITALOPRAM OXALATE 10 MG/1
5 TABLET ORAL DAILY
Status: DISCONTINUED | OUTPATIENT
Start: 2023-11-17 | End: 2023-11-19 | Stop reason: HOSPADM

## 2023-11-17 RX ORDER — DONEPEZIL HYDROCHLORIDE 5 MG/1
5 TABLET, FILM COATED ORAL NIGHTLY
Status: ON HOLD | COMMUNITY
End: 2023-11-19 | Stop reason: HOSPADM

## 2023-11-17 RX ORDER — ESCITALOPRAM OXALATE 5 MG/1
5 TABLET ORAL DAILY
COMMUNITY

## 2023-11-17 RX ORDER — ASPIRIN 81 MG/1
81 TABLET ORAL DAILY
COMMUNITY

## 2023-11-17 RX ORDER — DONEPEZIL HYDROCHLORIDE 5 MG/1
5 TABLET, FILM COATED ORAL NIGHTLY
Status: DISCONTINUED | OUTPATIENT
Start: 2023-11-17 | End: 2023-11-17

## 2023-11-17 RX ORDER — DOCUSATE SODIUM 100 MG/1
100 CAPSULE, LIQUID FILLED ORAL DAILY
Status: DISCONTINUED | OUTPATIENT
Start: 2023-11-17 | End: 2023-11-17

## 2023-11-17 RX ORDER — LANOLIN ALCOHOL/MO/W.PET/CERES
3 CREAM (GRAM) TOPICAL NIGHTLY PRN
Status: DISCONTINUED | OUTPATIENT
Start: 2023-11-17 | End: 2023-11-19 | Stop reason: HOSPADM

## 2023-11-17 RX ORDER — SODIUM CHLORIDE 9 MG/ML
INJECTION, SOLUTION INTRAVENOUS CONTINUOUS
Status: DISCONTINUED | OUTPATIENT
Start: 2023-11-17 | End: 2023-11-17

## 2023-11-17 RX ORDER — CIPROFLOXACIN 250 MG/1
250 TABLET, FILM COATED ORAL 2 TIMES DAILY
Status: ON HOLD | COMMUNITY
Start: 2023-11-14 | End: 2023-11-19 | Stop reason: HOSPADM

## 2023-11-17 RX ORDER — HYDROCHLOROTHIAZIDE 12.5 MG/1
12.5 CAPSULE, GELATIN COATED ORAL DAILY
Status: DISCONTINUED | OUTPATIENT
Start: 2023-11-17 | End: 2023-11-17

## 2023-11-17 RX ORDER — ACETAMINOPHEN 325 MG/1
650 TABLET ORAL EVERY 6 HOURS PRN
Status: DISCONTINUED | OUTPATIENT
Start: 2023-11-17 | End: 2023-11-17

## 2023-11-17 RX ORDER — CIPROFLOXACIN 250 MG/1
250 TABLET, FILM COATED ORAL 2 TIMES DAILY
Status: DISCONTINUED | OUTPATIENT
Start: 2023-11-17 | End: 2023-11-17

## 2023-11-17 RX ORDER — ONDANSETRON 4 MG/1
4 TABLET, ORALLY DISINTEGRATING ORAL EVERY 8 HOURS PRN
Status: DISCONTINUED | OUTPATIENT
Start: 2023-11-17 | End: 2023-11-17

## 2023-11-17 RX ORDER — ONDANSETRON 2 MG/ML
4 INJECTION INTRAMUSCULAR; INTRAVENOUS EVERY 6 HOURS PRN
Status: DISCONTINUED | OUTPATIENT
Start: 2023-11-17 | End: 2023-11-17

## 2023-11-17 RX ORDER — MAGNESIUM SULFATE IN WATER 40 MG/ML
2000 INJECTION, SOLUTION INTRAVENOUS PRN
Status: DISCONTINUED | OUTPATIENT
Start: 2023-11-17 | End: 2023-11-17

## 2023-11-17 RX ORDER — DONEPEZIL HYDROCHLORIDE 5 MG/1
5 TABLET, FILM COATED ORAL
Status: DISCONTINUED | OUTPATIENT
Start: 2023-11-18 | End: 2023-11-19 | Stop reason: HOSPADM

## 2023-11-17 RX ORDER — AMLODIPINE BESYLATE 10 MG/1
10 TABLET ORAL DAILY
Status: DISCONTINUED | OUTPATIENT
Start: 2023-11-17 | End: 2023-11-17

## 2023-11-17 RX ORDER — POLYETHYLENE GLYCOL 3350 17 G/17G
17 POWDER, FOR SOLUTION ORAL DAILY
Status: DISCONTINUED | OUTPATIENT
Start: 2023-11-17 | End: 2023-11-17

## 2023-11-17 RX ORDER — FENTANYL CITRATE 50 UG/ML
25 INJECTION, SOLUTION INTRAMUSCULAR; INTRAVENOUS ONCE
Status: COMPLETED | OUTPATIENT
Start: 2023-11-17 | End: 2023-11-17

## 2023-11-17 RX ORDER — ATORVASTATIN CALCIUM 20 MG/1
20 TABLET, FILM COATED ORAL DAILY
Status: DISCONTINUED | OUTPATIENT
Start: 2023-11-17 | End: 2023-11-17

## 2023-11-17 RX ORDER — HYDROCHLOROTHIAZIDE 12.5 MG/1
12.5 CAPSULE, GELATIN COATED ORAL DAILY
Status: ON HOLD | COMMUNITY
End: 2023-11-19 | Stop reason: HOSPADM

## 2023-11-17 RX ORDER — ESCITALOPRAM OXALATE 10 MG/1
5 TABLET ORAL DAILY
Status: DISCONTINUED | OUTPATIENT
Start: 2023-11-17 | End: 2023-11-17

## 2023-11-17 RX ORDER — ASPIRIN 81 MG/1
81 TABLET ORAL DAILY
Status: DISCONTINUED | OUTPATIENT
Start: 2023-11-17 | End: 2023-11-17

## 2023-11-17 RX ORDER — POTASSIUM CHLORIDE 7.45 MG/ML
10 INJECTION INTRAVENOUS PRN
Status: DISCONTINUED | OUTPATIENT
Start: 2023-11-17 | End: 2023-11-17

## 2023-11-17 RX ORDER — ENOXAPARIN SODIUM 100 MG/ML
40 INJECTION SUBCUTANEOUS DAILY
Status: DISCONTINUED | OUTPATIENT
Start: 2023-11-17 | End: 2023-11-17

## 2023-11-17 RX ORDER — SODIUM CHLORIDE 9 MG/ML
INJECTION, SOLUTION INTRAVENOUS PRN
Status: DISCONTINUED | OUTPATIENT
Start: 2023-11-17 | End: 2023-11-17

## 2023-11-17 RX ORDER — FENTANYL CITRATE 50 UG/ML
25 INJECTION, SOLUTION INTRAMUSCULAR; INTRAVENOUS
Status: DISCONTINUED | OUTPATIENT
Start: 2023-11-17 | End: 2023-11-19 | Stop reason: HOSPADM

## 2023-11-17 RX ORDER — SODIUM CHLORIDE 0.9 % (FLUSH) 0.9 %
5-40 SYRINGE (ML) INJECTION EVERY 12 HOURS SCHEDULED
Status: DISCONTINUED | OUTPATIENT
Start: 2023-11-17 | End: 2023-11-17

## 2023-11-17 RX ADMIN — FENTANYL CITRATE 25 MCG: 50 INJECTION INTRAMUSCULAR; INTRAVENOUS at 19:58

## 2023-11-17 RX ADMIN — POTASSIUM BICARBONATE 40 MEQ: 782 TABLET, EFFERVESCENT ORAL at 16:00

## 2023-11-17 RX ADMIN — MELATONIN 3 MG ORAL TABLET 3 MG: 3 TABLET ORAL at 19:58

## 2023-11-17 RX ADMIN — FENTANYL CITRATE 25 MCG: 50 INJECTION INTRAMUSCULAR; INTRAVENOUS at 15:59

## 2023-11-17 RX ADMIN — ESCITALOPRAM OXALATE 5 MG: 10 TABLET ORAL at 19:05

## 2023-11-17 RX ADMIN — IOPAMIDOL 75 ML: 755 INJECTION, SOLUTION INTRAVENOUS at 12:21

## 2023-11-17 RX ADMIN — AMLODIPINE BESYLATE 5 MG: 5 TABLET ORAL at 19:05

## 2023-11-17 ASSESSMENT — PAIN DESCRIPTION - ORIENTATION
ORIENTATION: LEFT
ORIENTATION: LEFT

## 2023-11-17 ASSESSMENT — PAIN - FUNCTIONAL ASSESSMENT
PAIN_FUNCTIONAL_ASSESSMENT: ACTIVITIES ARE NOT PREVENTED
PAIN_FUNCTIONAL_ASSESSMENT: PREVENTS OR INTERFERES SOME ACTIVE ACTIVITIES AND ADLS

## 2023-11-17 ASSESSMENT — PAIN DESCRIPTION - FREQUENCY: FREQUENCY: CONTINUOUS

## 2023-11-17 ASSESSMENT — PAIN DESCRIPTION - ONSET: ONSET: ON-GOING

## 2023-11-17 ASSESSMENT — PAIN DESCRIPTION - DESCRIPTORS: DESCRIPTORS: ACHING;DISCOMFORT

## 2023-11-17 ASSESSMENT — LIFESTYLE VARIABLES
HOW MANY STANDARD DRINKS CONTAINING ALCOHOL DO YOU HAVE ON A TYPICAL DAY: 1 OR 2
HOW OFTEN DO YOU HAVE A DRINK CONTAINING ALCOHOL: MONTHLY OR LESS

## 2023-11-17 ASSESSMENT — PAIN SCALES - GENERAL
PAINLEVEL_OUTOF10: 0
PAINLEVEL_OUTOF10: 5
PAINLEVEL_OUTOF10: 5

## 2023-11-17 ASSESSMENT — PAIN DESCRIPTION - PAIN TYPE: TYPE: ACUTE PAIN

## 2023-11-17 ASSESSMENT — PAIN DESCRIPTION - LOCATION
LOCATION: BACK
LOCATION: HIP
LOCATION: BACK

## 2023-11-17 NOTE — ED NOTES
Called report to [facility] for pt to transfer to [facility].      Gary Sánchez RN  11/17/23 [id number]

## 2023-11-17 NOTE — ED NOTES
Pt family requesting this RN and Antonette Pandya RN walk patient to the bathroom to see if she would tolerate it. Pt up with this RN and Antonette Pandya RN and ambulating w/steady gait. Pt able to use restroom outside of room 9 and ambulate back to room 10 with this RN and Antonette Pandya RN. This RN asked patient pain status and patient said pain was minimal. Got patient back in bed and hooked up to monitor. Blankets applied and pillow placed behind patient head. Pt satisfied at this time. Will continue to monitor.      Berenice Wilhelm RN  11/17/23 1743

## 2023-11-17 NOTE — ED PROVIDER NOTES
Elizabeth        Pt Name: Radha Downs  MRN: [id number]  [address] [facility] 10/12/1932  Date of evaluation: 11/17/2023  Provider: Mariaelena Hernandez DO  PCP: Jhon Rodriguez MD  Note Started: 3:57 PM EST 11/17/23    CHIEF COMPLAINT       Chief Complaint   Patient presents with    Abdominal Pain     Right lower abdominal pain beginning 4 weeks ago after catching daughter from falling. Patient is ambulatory with no difficulty. HISTORY OF PRESENT ILLNESS: 1 or more Elements   History From: Patient     Limitations to history : None    Radha Downs is a 80 y.o. female with past medical history of hypertension, hyperlipidemia and cardiomegaly who presents to the emergency department due to abdominal pain. Patient symptoms have been ongoing for 4 weeks and has been persistent. Family is at bedside as well and helps provide some history. Patient localizes her pain to her right lower abdomen and groin region. She states that the pain is constant, sometimes sharp and nonradiating. Nothing in particular makes the pain better but movement makes the pain worse. Patient has been grimacing when she is ambulating because of her severe pain. Family did note that patient had outpatient labs done including urinalysis. Apparently the urinalysis was unremarkable for UTI however PCP placed the patient on antibiotics anyways. Despite antibiotic therapy, patient has been having persistent pain. She was brought into the ED for further evaluation. Patient denies any direct trauma or injury to the area where her pain is present. She has no other symptoms otherwise including nausea, vomiting, fever, chills, headache, lightheadedness, dizziness, syncope, cough, congestion, sore throat, urinary symptoms, constipation or diarrhea. On initial assessment, patient is nontoxic-appearing and in no acute distress.   She is pleasant on

## 2023-11-18 LAB
FOLATE SERPL-MCNC: 15.2 NG/ML (ref 4.8–24.2)
TSH SERPL DL<=0.05 MIU/L-ACNC: 0.8 UIU/ML (ref 0.27–4.2)
VIT B12 SERPL-MCNC: 292 PG/ML (ref 211–946)

## 2023-11-18 PROCEDURE — 6360000002 HC RX W HCPCS: Performed by: INTERNAL MEDICINE

## 2023-11-18 PROCEDURE — 97530 THERAPEUTIC ACTIVITIES: CPT

## 2023-11-18 PROCEDURE — 82746 ASSAY OF FOLIC ACID SERUM: CPT

## 2023-11-18 PROCEDURE — 99221 1ST HOSP IP/OBS SF/LOW 40: CPT | Performed by: INTERNAL MEDICINE

## 2023-11-18 PROCEDURE — 82607 VITAMIN B-12: CPT

## 2023-11-18 PROCEDURE — 36415 COLL VENOUS BLD VENIPUNCTURE: CPT

## 2023-11-18 PROCEDURE — 2060000000 HC ICU INTERMEDIATE R&B

## 2023-11-18 PROCEDURE — 84443 ASSAY THYROID STIM HORMONE: CPT

## 2023-11-18 PROCEDURE — 97161 PT EVAL LOW COMPLEX 20 MIN: CPT

## 2023-11-18 PROCEDURE — 6370000000 HC RX 637 (ALT 250 FOR IP): Performed by: INTERNAL MEDICINE

## 2023-11-18 PROCEDURE — 93005 ELECTROCARDIOGRAM TRACING: CPT | Performed by: INTERNAL MEDICINE

## 2023-11-18 RX ORDER — ATORVASTATIN CALCIUM 20 MG/1
20 TABLET, FILM COATED ORAL NIGHTLY
Status: DISCONTINUED | OUTPATIENT
Start: 2023-11-18 | End: 2023-11-19 | Stop reason: HOSPADM

## 2023-11-18 RX ORDER — PANTOPRAZOLE SODIUM 40 MG/1
40 TABLET, DELAYED RELEASE ORAL
Status: DISCONTINUED | OUTPATIENT
Start: 2023-11-19 | End: 2023-11-19 | Stop reason: HOSPADM

## 2023-11-18 RX ORDER — ONDANSETRON 2 MG/ML
4 INJECTION INTRAMUSCULAR; INTRAVENOUS EVERY 6 HOURS PRN
Status: DISCONTINUED | OUTPATIENT
Start: 2023-11-18 | End: 2023-11-19 | Stop reason: HOSPADM

## 2023-11-18 RX ORDER — ENEMA 19; 7 G/133ML; G/133ML
1 ENEMA RECTAL
Status: DISCONTINUED | OUTPATIENT
Start: 2023-11-18 | End: 2023-11-19 | Stop reason: SDUPTHER

## 2023-11-18 RX ORDER — ACETAMINOPHEN 500 MG
500 TABLET ORAL EVERY 4 HOURS PRN
Status: DISCONTINUED | OUTPATIENT
Start: 2023-11-18 | End: 2023-11-19 | Stop reason: HOSPADM

## 2023-11-18 RX ORDER — ASPIRIN 81 MG/1
81 TABLET ORAL DAILY
Status: DISCONTINUED | OUTPATIENT
Start: 2023-11-18 | End: 2023-11-19 | Stop reason: HOSPADM

## 2023-11-18 RX ADMIN — ASPIRIN 81 MG: 81 TABLET, COATED ORAL at 14:20

## 2023-11-18 RX ADMIN — MELATONIN 3 MG ORAL TABLET 3 MG: 3 TABLET ORAL at 20:45

## 2023-11-18 RX ADMIN — ATORVASTATIN CALCIUM 20 MG: 20 TABLET, FILM COATED ORAL at 20:45

## 2023-11-18 RX ADMIN — ESCITALOPRAM OXALATE 5 MG: 10 TABLET ORAL at 08:07

## 2023-11-18 RX ADMIN — DONEPEZIL HYDROCHLORIDE 5 MG: 5 TABLET, FILM COATED ORAL at 08:07

## 2023-11-18 RX ADMIN — ACETAMINOPHEN 500 MG: 500 TABLET ORAL at 14:20

## 2023-11-18 RX ADMIN — ONDANSETRON 4 MG: 2 INJECTION INTRAMUSCULAR; INTRAVENOUS at 14:21

## 2023-11-18 RX ADMIN — AMLODIPINE BESYLATE 5 MG: 5 TABLET ORAL at 08:07

## 2023-11-18 ASSESSMENT — PAIN SCALES - GENERAL: PAINLEVEL_OUTOF10: 8

## 2023-11-18 ASSESSMENT — PAIN DESCRIPTION - LOCATION: LOCATION: NECK;HEAD

## 2023-11-18 NOTE — PLAN OF CARE
Problem: Discharge Planning  Goal: Discharge to home or other facility with appropriate resources  Outcome: Progressing  Flowsheets (Taken 11/17/2023 2100 by Ramon Campbell, Blair Solis RN)  Discharge to home or other facility with appropriate resources:   Identify discharge learning needs (meds, wound care, etc)   Identify barriers to discharge with patient and caregiver     Problem: Pain  Goal: Verbalizes/displays adequate comfort level or baseline comfort level  Outcome: Progressing     Problem: ABCDS Injury Assessment  Goal: Absence of physical injury  Outcome: Progressing     Problem: Safety - Adult  Goal: Free from fall injury  Outcome: Progressing

## 2023-11-18 NOTE — H&P
[address], [address] [place] [address]                              HISTORY AND PHYSICAL    PATIENT NAME: Liz Waldron                   :        10/12/1932  MED REC NO:   [id number]                            ROOM:       [id number]  ACCOUNT NO:   [de-identified]                           ADMIT DATE: 2023  PROVIDER:     Ankita Manjarrez MD    HISTORY OF PRESENT ILLNESS:  The patient is a 57-year-old lady who was  admitted through the emergency room because of refractory pain in her  right lower quadrant. This pain has been present for one month after  she strained a muscle catching her falling daughter who was falling in  front of her. Her daughter has Parkinson's disease. Since the last month she is suffering from Failure to thrive with poor oral intake of food and poor appetite. The patient is eating poorly since the event and has lost at  least 10 pounds of weight. Occasional diarrhea has occurred with stool  Incontinence. She denied any other  symptoms except this chronic continuous right lower quadrant pain. SOCIAL HISTORY: She and her  are [address] [place] [address] residents. Her  is well. There are 4 children and she knows their names.  over 61 years   PAST MEDICAL HISTORY:She has had longstanding history of  hypertension, GERD,and hyperlipidemia. She was admitted  several years ago with chest pain. Had negative stress test.   Admitted two years ago with COVID pneumonia and  released. According to the family, she has had progressive loss of  cognitive function for several years and has been placed on donepezil by  her family doctor. PAST SURGICAL HISTORY:  Hysterectomy, appendectomy, cholecystectomy.     MEDICATIONS:  Her list of medicines includes omeprazole 40 mg daily,  Lexapro 5 mg daily, melatonin 5 mg nightly, atorvastatin 20 mg daily,  HCTZ 12.5 mg daily, Donepezil 5 mg daily, amlodipine 5 mg

## 2023-11-19 ENCOUNTER — APPOINTMENT (OUTPATIENT)
Dept: GENERAL RADIOLOGY | Age: 88
DRG: 392 | End: 2023-11-19
Payer: MEDICARE

## 2023-11-19 VITALS
HEIGHT: 62 IN | DIASTOLIC BLOOD PRESSURE: 68 MMHG | SYSTOLIC BLOOD PRESSURE: 112 MMHG | BODY MASS INDEX: 22.08 KG/M2 | RESPIRATION RATE: 17 BRPM | TEMPERATURE: 97.6 F | WEIGHT: 120 LBS | HEART RATE: 74 BPM | OXYGEN SATURATION: 92 %

## 2023-11-19 LAB
MICROORGANISM SPEC CULT: NO GROWTH
SPECIMEN DESCRIPTION: NORMAL

## 2023-11-19 PROCEDURE — 99231 SBSQ HOSP IP/OBS SF/LOW 25: CPT | Performed by: INTERNAL MEDICINE

## 2023-11-19 PROCEDURE — 6370000000 HC RX 637 (ALT 250 FOR IP): Performed by: INTERNAL MEDICINE

## 2023-11-19 PROCEDURE — 74018 RADEX ABDOMEN 1 VIEW: CPT

## 2023-11-19 RX ORDER — DONEPEZIL HYDROCHLORIDE 5 MG/1
5 TABLET, FILM COATED ORAL
Qty: 30 TABLET | Refills: 3 | Status: SHIPPED | OUTPATIENT
Start: 2023-11-20

## 2023-11-19 RX ORDER — AMLODIPINE BESYLATE 5 MG/1
5 TABLET ORAL DAILY
Qty: 30 TABLET | Refills: 3 | Status: SHIPPED | OUTPATIENT
Start: 2023-11-20

## 2023-11-19 RX ORDER — MECOBALAMIN 5000 MCG
10 TABLET,DISINTEGRATING ORAL NIGHTLY
Qty: 15 TABLET | Refills: 0 | Status: SHIPPED | OUTPATIENT
Start: 2023-11-19

## 2023-11-19 RX ORDER — LIDOCAINE 50 MG/G
OINTMENT TOPICAL 3 TIMES DAILY
Qty: 1 EACH | Refills: 3 | Status: SHIPPED | OUTPATIENT
Start: 2023-11-19

## 2023-11-19 RX ORDER — ENEMA 19; 7 G/133ML; G/133ML
1 ENEMA RECTAL
Status: DISCONTINUED | OUTPATIENT
Start: 2023-11-19 | End: 2023-11-19 | Stop reason: HOSPADM

## 2023-11-19 RX ADMIN — DONEPEZIL HYDROCHLORIDE 5 MG: 5 TABLET, FILM COATED ORAL at 07:45

## 2023-11-19 RX ADMIN — PANTOPRAZOLE SODIUM 40 MG: 40 TABLET, DELAYED RELEASE ORAL at 05:39

## 2023-11-19 RX ADMIN — AMLODIPINE BESYLATE 5 MG: 5 TABLET ORAL at 07:45

## 2023-11-19 RX ADMIN — ASPIRIN 81 MG: 81 TABLET, COATED ORAL at 07:45

## 2023-11-19 RX ADMIN — ESCITALOPRAM OXALATE 5 MG: 10 TABLET ORAL at 07:45

## 2023-11-20 LAB
EKG ATRIAL RATE: 77 BPM
EKG P AXIS: 48 DEGREES
EKG P-R INTERVAL: 152 MS
EKG Q-T INTERVAL: 390 MS
EKG QRS DURATION: 78 MS
EKG QTC CALCULATION (BAZETT): 441 MS
EKG R AXIS: -13 DEGREES
EKG T AXIS: 49 DEGREES
EKG VENTRICULAR RATE: 77 BPM

## 2023-12-14 NOTE — PLAN OF CARE
Oxytocin Safety Progress Check Note - Pina Molina 32 y.o. female MRN: [id number]    Unit/Bed#: L&D 323-01 Encounter: [id number]    Dose (eun-units/min) Oxytocin: 26 eun-units/min  Contraction Frequency (minutes): 2-3 (difficult to  ctx on monitor when pt on side, monitor adjusted several times)  Contraction Intensity: Mild/Moderate  Uterine Activity Characteristics: Regular  Cervical Dilation: 9        Cervical Effacement: 90  Fetal Station: -1  Baseline Rate (FHR): 140 bpm  Fetal Heart Rate (FHT): 130 BPM  FHR Category: Cat 1               Vital Signs:   Vitals:    12/13/23 1952   BP: 96/54   Pulse: 82   Resp:    Temp:        Notes/comments:   SVE as above. FHT Cat 1. Pt more comfortable with epidural since rebolus.    D/w Dr. Mynor Taveras, DO 12/13/2023 8:20 PM Problem: Discharge Planning  Goal: Discharge to home or other facility with appropriate resources  Outcome: Progressing     Problem: Pain  Goal: Verbalizes/displays adequate comfort level or baseline comfort level  Outcome: Progressing     Problem: ABCDS Injury Assessment  Goal: Absence of physical injury  Outcome: Progressing

## 2024-01-30 RX ORDER — MIRTAZAPINE 7.5 MG/1
3.75 TABLET, FILM COATED ORAL NIGHTLY
Qty: 15 TABLET | Refills: 3 | Status: SHIPPED | OUTPATIENT
Start: 2024-01-30

## 2024-05-04 ENCOUNTER — HOSPITAL ENCOUNTER (OUTPATIENT)
Age: 89
End: 2024-05-04
Payer: MEDICARE

## 2024-05-04 ENCOUNTER — HOSPITAL ENCOUNTER (OUTPATIENT)
Dept: CT IMAGING | Age: 89
Discharge: HOME | End: 2024-05-06
Payer: MEDICARE

## 2024-05-04 DIAGNOSIS — Z71.89 HEAD INJURY CONSULTATION: ICD-10-CM

## 2024-05-04 PROCEDURE — 70450 CT HEAD/BRAIN W/O DYE: CPT

## 2024-05-31 RX ORDER — MIRTAZAPINE 7.5 MG/1
3.75 TABLET, FILM COATED ORAL NIGHTLY
Qty: 15 TABLET | Refills: 3 | Status: SHIPPED | OUTPATIENT
Start: 2024-05-31

## 2024-06-12 ENCOUNTER — HOSPITAL ENCOUNTER (OUTPATIENT)
Age: 89
Discharge: HOME | End: 2024-06-12
Payer: MEDICARE

## 2024-06-12 DIAGNOSIS — I50.41 ACUTE COMBINED SYSTOLIC (CONGESTIVE) AND DIASTOLIC (CONGESTIVE) HEART FAILURE (HCC): ICD-10-CM

## 2024-06-12 DIAGNOSIS — R40.0 SOMNOLENCE: Primary | ICD-10-CM

## 2024-06-12 DIAGNOSIS — R40.0 SOMNOLENCE: ICD-10-CM

## 2024-06-12 LAB
ALBUMIN SERPL-MCNC: 3.7 G/DL (ref 3.5–5.2)
ALP SERPL-CCNC: 54 U/L (ref 35–104)
ALT SERPL-CCNC: 5 U/L (ref 0–32)
ANION GAP SERPL CALCULATED.3IONS-SCNC: 14 MMOL/L (ref 7–16)
AST SERPL-CCNC: 11 U/L (ref 0–31)
BASOPHILS # BLD: 0.04 K/UL (ref 0–0.2)
BASOPHILS NFR BLD: 1 % (ref 0–2)
BILIRUB SERPL-MCNC: 0.5 MG/DL (ref 0–1.2)
BILIRUB UR QL STRIP: ABNORMAL
BNP SERPL-MCNC: 414 PG/ML (ref 0–450)
BUN SERPL-MCNC: 11 MG/DL (ref 6–23)
CALCIUM SERPL-MCNC: 9.3 MG/DL (ref 8.6–10.2)
CHLORIDE SERPL-SCNC: 102 MMOL/L (ref 98–107)
CLARITY UR: ABNORMAL
CO2 SERPL-SCNC: 24 MMOL/L (ref 22–29)
COLOR UR: YELLOW
CORTIS SERPL-MCNC: 22.8 UG/DL (ref 2.7–18.4)
CORTISOL COLLECTION INFO: ABNORMAL
CREAT SERPL-MCNC: 0.6 MG/DL (ref 0.5–1)
EOSINOPHIL # BLD: 0.04 K/UL (ref 0.05–0.5)
EOSINOPHILS RELATIVE PERCENT: 1 % (ref 0–6)
ERYTHROCYTE [DISTWIDTH] IN BLOOD BY AUTOMATED COUNT: 12.3 % (ref 11.5–15)
ERYTHROCYTE [SEDIMENTATION RATE] IN BLOOD BY WESTERGREN METHOD: 56 MM/HR (ref 0–20)
GFR, ESTIMATED: 85 ML/MIN/1.73M2
GLUCOSE SERPL-MCNC: 100 MG/DL (ref 74–99)
GLUCOSE UR STRIP-MCNC: NEGATIVE MG/DL
HCT VFR BLD AUTO: 36.4 % (ref 34–48)
HGB BLD-MCNC: 12.3 G/DL (ref 11.5–15.5)
HGB UR QL STRIP.AUTO: ABNORMAL
IMM GRANULOCYTES # BLD AUTO: 0.03 K/UL (ref 0–0.58)
IMM GRANULOCYTES NFR BLD: 0 % (ref 0–5)
KETONES UR STRIP-MCNC: ABNORMAL MG/DL
LEUKOCYTE ESTERASE UR QL STRIP: ABNORMAL
LYMPHOCYTES NFR BLD: 1.77 K/UL (ref 1.5–4)
LYMPHOCYTES RELATIVE PERCENT: 23 % (ref 20–42)
MCH RBC QN AUTO: 29.4 PG (ref 26–35)
MCHC RBC AUTO-ENTMCNC: 33.8 G/DL (ref 32–34.5)
MCV RBC AUTO: 87.1 FL (ref 80–99.9)
MONOCYTES NFR BLD: 0.7 K/UL (ref 0.1–0.95)
MONOCYTES NFR BLD: 9 % (ref 2–12)
NEUTROPHILS NFR BLD: 66 % (ref 43–80)
NEUTS SEG NFR BLD: 5.09 K/UL (ref 1.8–7.3)
NITRITE UR QL STRIP: POSITIVE
PH UR STRIP: 6.5 [PH] (ref 5–9)
PLATELET # BLD AUTO: 348 K/UL (ref 130–450)
PMV BLD AUTO: 9.6 FL (ref 7–12)
POTASSIUM SERPL-SCNC: 3 MMOL/L (ref 3.5–5)
PROT SERPL-MCNC: 6.9 G/DL (ref 6.4–8.3)
PROT UR STRIP-MCNC: 30 MG/DL
RBC # BLD AUTO: 4.18 M/UL (ref 3.5–5.5)
RBC #/AREA URNS HPF: ABNORMAL /HPF
SODIUM SERPL-SCNC: 140 MMOL/L (ref 132–146)
SP GR UR STRIP: 1.02 (ref 1–1.03)
TSH SERPL DL<=0.05 MIU/L-ACNC: 1.11 UIU/ML (ref 0.27–4.2)
UROBILINOGEN UR STRIP-ACNC: 4 EU/DL (ref 0–1)
VIT B12 SERPL-MCNC: 466 PG/ML (ref 211–946)
WBC #/AREA URNS HPF: ABNORMAL /HPF
WBC OTHER # BLD: 7.7 K/UL (ref 4.5–11.5)

## 2024-06-12 PROCEDURE — 82607 VITAMIN B-12: CPT

## 2024-06-12 PROCEDURE — 36415 COLL VENOUS BLD VENIPUNCTURE: CPT

## 2024-06-12 PROCEDURE — 81001 URINALYSIS AUTO W/SCOPE: CPT

## 2024-06-12 PROCEDURE — 82533 TOTAL CORTISOL: CPT

## 2024-06-12 PROCEDURE — 84443 ASSAY THYROID STIM HORMONE: CPT

## 2024-06-12 PROCEDURE — 80053 COMPREHEN METABOLIC PANEL: CPT

## 2024-06-12 PROCEDURE — 83880 ASSAY OF NATRIURETIC PEPTIDE: CPT

## 2024-06-12 PROCEDURE — 85025 COMPLETE CBC W/AUTO DIFF WBC: CPT

## 2024-06-12 PROCEDURE — 87040 BLOOD CULTURE FOR BACTERIA: CPT

## 2024-06-12 PROCEDURE — 85652 RBC SED RATE AUTOMATED: CPT

## 2024-06-13 RX ORDER — CEPHALEXIN 500 MG/1
500 CAPSULE ORAL 2 TIMES DAILY
Qty: 20 CAPSULE | Refills: 0 | Status: SHIPPED | OUTPATIENT
Start: 2024-06-13 | End: 2024-06-23

## 2024-06-16 LAB
MICROORGANISM SPEC CULT: NORMAL
SERVICE CMNT-IMP: NORMAL
SPECIMEN DESCRIPTION: NORMAL

## 2024-06-17 LAB
MICROORGANISM SPEC CULT: NORMAL
SERVICE CMNT-IMP: NORMAL
SPECIMEN DESCRIPTION: NORMAL

## 2024-07-03 RX ORDER — CEFDINIR 300 MG/1
300 CAPSULE ORAL 2 TIMES DAILY
Qty: 20 CAPSULE | Refills: 0 | Status: SHIPPED | OUTPATIENT
Start: 2024-07-03 | End: 2024-07-13

## 2024-08-12 RX ORDER — PREDNISONE 5 MG/1
5 TABLET ORAL DAILY
Qty: 30 TABLET | Refills: 0 | Status: SHIPPED | OUTPATIENT
Start: 2024-08-12 | End: 2024-09-11

## 2024-08-20 VITALS
HEART RATE: 71 BPM | HEIGHT: 64 IN | BODY MASS INDEX: 21.85 KG/M2 | DIASTOLIC BLOOD PRESSURE: 82 MMHG | SYSTOLIC BLOOD PRESSURE: 132 MMHG | WEIGHT: 128 LBS | OXYGEN SATURATION: 94 %

## 2024-08-20 RX ORDER — HYDROCHLOROTHIAZIDE 12.5 MG/1
12.5 CAPSULE, GELATIN COATED ORAL DAILY
COMMUNITY

## 2024-08-20 RX ORDER — HYDROCORTISONE ACETATE 25 MG/1
25 SUPPOSITORY RECTAL 2 TIMES DAILY
COMMUNITY

## 2024-09-05 ENCOUNTER — OFFICE VISIT (OUTPATIENT)
Age: 89
End: 2024-09-05
Payer: MEDICARE

## 2024-09-05 VITALS
BODY MASS INDEX: 19.7 KG/M2 | SYSTOLIC BLOOD PRESSURE: 134 MMHG | DIASTOLIC BLOOD PRESSURE: 70 MMHG | OXYGEN SATURATION: 91 % | HEART RATE: 84 BPM | WEIGHT: 115.4 LBS | HEIGHT: 64 IN

## 2024-09-05 DIAGNOSIS — G30.9 ALZHEIMER DISEASE (HCC): ICD-10-CM

## 2024-09-05 DIAGNOSIS — F02.80 ALZHEIMER DISEASE (HCC): ICD-10-CM

## 2024-09-05 DIAGNOSIS — E78.2 MIXED HYPERLIPIDEMIA: ICD-10-CM

## 2024-09-05 DIAGNOSIS — I10 BENIGN HYPERTENSION: ICD-10-CM

## 2024-09-05 DIAGNOSIS — Z00.00 MEDICARE ANNUAL WELLNESS VISIT, SUBSEQUENT: Primary | ICD-10-CM

## 2024-09-05 PROCEDURE — 1123F ACP DISCUSS/DSCN MKR DOCD: CPT | Performed by: FAMILY MEDICINE

## 2024-09-05 PROCEDURE — G0439 PPPS, SUBSEQ VISIT: HCPCS | Performed by: FAMILY MEDICINE

## 2024-09-05 SDOH — ECONOMIC STABILITY: FOOD INSECURITY: WITHIN THE PAST 12 MONTHS, THE FOOD YOU BOUGHT JUST DIDN'T LAST AND YOU DIDN'T HAVE MONEY TO GET MORE.: NEVER TRUE

## 2024-09-05 SDOH — ECONOMIC STABILITY: INCOME INSECURITY: HOW HARD IS IT FOR YOU TO PAY FOR THE VERY BASICS LIKE FOOD, HOUSING, MEDICAL CARE, AND HEATING?: NOT HARD AT ALL

## 2024-09-05 SDOH — ECONOMIC STABILITY: FOOD INSECURITY: WITHIN THE PAST 12 MONTHS, YOU WORRIED THAT YOUR FOOD WOULD RUN OUT BEFORE YOU GOT MONEY TO BUY MORE.: NEVER TRUE

## 2024-09-05 ASSESSMENT — PATIENT HEALTH QUESTIONNAIRE - PHQ9
SUM OF ALL RESPONSES TO PHQ9 QUESTIONS 1 & 2: 0
SUM OF ALL RESPONSES TO PHQ QUESTIONS 1-9: 0
2. FEELING DOWN, DEPRESSED OR HOPELESS: NOT AT ALL
SUM OF ALL RESPONSES TO PHQ QUESTIONS 1-9: 0
1. LITTLE INTEREST OR PLEASURE IN DOING THINGS: NOT AT ALL
SUM OF ALL RESPONSES TO PHQ QUESTIONS 1-9: 0
SUM OF ALL RESPONSES TO PHQ QUESTIONS 1-9: 0

## 2024-09-05 NOTE — PROGRESS NOTES
Medicare Annual Wellness Visit    Fabby Milligan is here for Medicare AWV ([facility] Pt states doing good.  No complaints.  No recent labs)    Assessment & Plan   Medicare annual wellness visit, subsequent  Benign hypertension  Mixed hyperlipidemia  Alzheimer disease (HCC)    Next visit we will do a CBC metabolic panel lipid panel I am thinking about stopping her hydrochlorothiazide I do not think she may need it and she is low on potassium at this will make that decision at a later date.  She will follow-up with me in 6 months or earlier if need be  Recommendations for Preventive Services Due: see orders and patient instructions/AVS.  Recommended screening schedule for the next 5-10 years is provided to the patient in written form: see Patient Instructions/AVS.     Return in 6 months (on 3/5/2025) for Medicare Annual Wellness Visit in 1 year.     Subjective     Patient comes in today for a wellness visit she is here with her .  She does have a moderate Alzheimer's dementia she sees Dr. Uday sarah.  Her 3 word recall was 0 her clock was abnormal.  Her appetite is decreased somewhat she is 1 from about 1 28-1 15 since May.  Her  is aware of this.  She is not taking a lot of her medications but we updated her med list.  Right now she is on Norvasc Deltasone she takes Tylenol hydrochlorothiazide, Lipitor.  I will get an outpatient lipid panel.  Her other labs were recently reviewed.  She is pleasantly confused.  She is stable her vital signs are stable she will get a flu shot here in the next several weeks she has no cardiopulmonary complaints no bowel or bladder dysfunction she sleeps well at night according to her .  Patient's complete Health Risk Assessment and screening values have been reviewed and are found in Flowsheets. The following problems were reviewed today and where indicated follow up appointments were made and/or referrals ordered.    Positive Risk Factor Screenings with

## 2024-09-05 NOTE — PATIENT INSTRUCTIONS
Learning About Being Active as an Older Adult  Why is being active important as you get older?     Being active is one of the best things you can do for your health. And it's never too late to start. Being active--or getting active, if you aren't already--has definite benefits. It can:  Give you more energy,  Keep your mind sharp.  Improve balance to reduce your risk of falls.  Help you manage chronic illness with fewer medicines.  No matter how old you are, how fit you are, or what health problems you have, there is a form of activity that will work for you. And the more physical activity you can do, the better your overall health will be.  What kinds of activity can help you stay healthy?  Being more active will make your daily activities easier. Physical activity includes planned exercise and things you do in daily life. There are four types of activity:  Aerobic.  Doing aerobic activity makes your heart and lungs strong.  Includes walking, dancing, and gardening.  Aim for at least 2½ hours spread throughout the week.  It improves your energy and can help you sleep better.  Muscle-strengthening.  This type of activity can help maintain muscle and strengthen bones.  Includes climbing stairs, using resistance bands, and lifting or carrying heavy loads.  Aim for at least twice a week.  It can help protect the knees and other joints.  Stretching.  Stretching gives you better range of motion in joints and muscles.  Includes upper arm stretches, calf stretches, and gentle yoga.  Aim for at least twice a week, preferably after your muscles are warmed up from other activities.  It can help you function better in daily life.  Balancing.  This helps you stay coordinated and have good posture.  Includes heel-to-toe walking, chas chi, and certain types of yoga.  Aim for at least 3 days a week.  It can reduce your risk of falling.  Even if you have a hard time meeting the recommendations, it's better to be more active

## 2024-09-09 RX ORDER — PREDNISONE 5 MG/1
5 TABLET ORAL DAILY
Qty: 30 TABLET | Refills: 0 | Status: SHIPPED
Start: 2024-09-09 | End: 2024-10-07

## 2024-10-07 RX ORDER — AMLODIPINE BESYLATE 5 MG/1
5 TABLET ORAL DAILY
Qty: 90 TABLET | Refills: 3 | Status: SHIPPED | OUTPATIENT
Start: 2024-10-07

## 2024-10-07 RX ORDER — PREDNISONE 5 MG/1
5 TABLET ORAL DAILY
Qty: 30 TABLET | Refills: 0 | Status: SHIPPED | OUTPATIENT
Start: 2024-10-07 | End: 2024-11-06

## 2024-10-14 ENCOUNTER — TELEPHONE (OUTPATIENT)
Age: 89
End: 2024-10-14

## 2024-10-14 DIAGNOSIS — M25.561 RIGHT KNEE PAIN, UNSPECIFIED CHRONICITY: Primary | ICD-10-CM

## 2024-10-14 NOTE — TELEPHONE ENCOUNTER
Order an x-ray of her right knee diagnosis osteoarthritis.  And make a referral to have her see one of the orthopedic surgeons at [facility] who takes care of knee issues either Dr. Maco Mcfarland

## 2024-10-14 NOTE — TELEPHONE ENCOUNTER
Pt's daughter-in-law, Luis, called to say pt has been having right knee pain that is constant. She is having trouble walking and told them it hurts. Luis said pt rarely complains so they know she must really be hurting. They feel she should get an x ray and asked if you would order one & fax to [facility]

## 2024-10-15 DIAGNOSIS — M25.561 RIGHT KNEE PAIN, UNSPECIFIED CHRONICITY: ICD-10-CM

## 2024-10-29 ENCOUNTER — OFFICE VISIT (OUTPATIENT)
Dept: ORTHOPEDIC SURGERY | Age: 89
End: 2024-10-29
Payer: MEDICARE

## 2024-10-29 VITALS — BODY MASS INDEX: 21.34 KG/M2 | WEIGHT: 125 LBS | HEIGHT: 64 IN

## 2024-10-29 DIAGNOSIS — M17.11 PRIMARY OSTEOARTHRITIS OF RIGHT KNEE: Primary | ICD-10-CM

## 2024-10-29 DIAGNOSIS — M25.461 EFFUSION OF RIGHT KNEE: ICD-10-CM

## 2024-10-29 DIAGNOSIS — G89.29 CHRONIC PAIN OF RIGHT KNEE: ICD-10-CM

## 2024-10-29 DIAGNOSIS — M25.561 CHRONIC PAIN OF RIGHT KNEE: ICD-10-CM

## 2024-10-29 PROCEDURE — 1125F AMNT PAIN NOTED PAIN PRSNT: CPT | Performed by: ORTHOPAEDIC SURGERY

## 2024-10-29 PROCEDURE — 1123F ACP DISCUSS/DSCN MKR DOCD: CPT | Performed by: ORTHOPAEDIC SURGERY

## 2024-10-29 PROCEDURE — 99203 OFFICE O/P NEW LOW 30 MIN: CPT | Performed by: ORTHOPAEDIC SURGERY

## 2024-10-29 PROCEDURE — 1159F MED LIST DOCD IN RCRD: CPT | Performed by: ORTHOPAEDIC SURGERY

## 2024-10-29 PROCEDURE — 20610 DRAIN/INJ JOINT/BURSA W/O US: CPT | Performed by: ORTHOPAEDIC SURGERY

## 2024-10-29 RX ORDER — TRIAMCINOLONE ACETONIDE 40 MG/ML
80 INJECTION, SUSPENSION INTRA-ARTICULAR; INTRAMUSCULAR ONCE
Status: COMPLETED | OUTPATIENT
Start: 2024-10-29 | End: 2024-10-29

## 2024-10-29 RX ADMIN — TRIAMCINOLONE ACETONIDE 80 MG: 40 INJECTION, SUSPENSION INTRA-ARTICULAR; INTRAMUSCULAR at 14:40

## 2024-10-29 ASSESSMENT — ENCOUNTER SYMPTOMS
SHORTNESS OF BREATH: 0
EYE DISCHARGE: 0
ABDOMINAL DISTENTION: 0
ALLERGIC/IMMUNOLOGIC NEGATIVE: 1

## 2024-10-29 NOTE — PROGRESS NOTES
Pain with pass ROM, Limited due to pain. Flexion contracture present.  AP Ligament Laxity <5mm  ML Ligament Laxity <5 degrees  Extension: <10 degrees  Extensor Mechanism: intact, can perform straight leg raise  Quad Muscle Mass: normal, good activation  Tenderness: medial joint line  Patella: positive grind, positive crepitus       Skin:     General: Skin is warm and dry.      Capillary Refill: Capillary refill takes less than 2 seconds.   Neurological:      General: No focal deficit present.      Mental Status: She is alert.   Psychiatric:         Mood and Affect: Mood normal.         Behavior: Behavior normal.            Past records reviewed including outside xrays    XRAYS:  Xrays reviewed -   AP/Lat - mod right knee DJD, no fractures    An electronic signature was used to authenticate this note.    --Caio Quintero MD

## 2024-11-22 RX ORDER — AMLODIPINE BESYLATE 5 MG/1
5 TABLET ORAL DAILY
Qty: 30 TABLET | Refills: 5 | Status: SHIPPED | OUTPATIENT
Start: 2024-11-22

## 2024-12-24 RX ORDER — NIRMATRELVIR AND RITONAVIR 150-100 MG
KIT ORAL
Qty: 20 TABLET | Refills: 0 | Status: SHIPPED | OUTPATIENT
Start: 2024-12-24 | End: 2024-12-29

## 2024-12-26 RX ORDER — CEPHALEXIN 500 MG/1
500 CAPSULE ORAL 2 TIMES DAILY
Qty: 20 CAPSULE | Refills: 0 | Status: SHIPPED | OUTPATIENT
Start: 2024-12-26

## 2024-12-29 RX ORDER — BUSPIRONE HYDROCHLORIDE 5 MG/1
5 TABLET ORAL 3 TIMES DAILY PRN
Qty: 90 TABLET | Refills: 0 | Status: SHIPPED | OUTPATIENT
Start: 2024-12-29 | End: 2025-01-28

## 2024-12-30 RX ORDER — BUSPIRONE HYDROCHLORIDE 5 MG/1
5 TABLET ORAL 3 TIMES DAILY
Qty: 90 TABLET | Refills: 0 | Status: SHIPPED | OUTPATIENT
Start: 2024-12-30 | End: 2025-01-29

## 2025-01-04 RX ORDER — DIVALPROEX SODIUM 125 MG/1
125 CAPSULE, COATED PELLETS ORAL DAILY
Qty: 30 CAPSULE | Refills: 3 | Status: SHIPPED | OUTPATIENT
Start: 2025-01-04

## 2025-01-13 ENCOUNTER — TELEPHONE (OUTPATIENT)
Age: 89
End: 2025-01-13

## 2025-01-13 DIAGNOSIS — J20.8 ACUTE BACTERIAL BRONCHITIS: Primary | ICD-10-CM

## 2025-01-13 DIAGNOSIS — B96.89 ACUTE BACTERIAL BRONCHITIS: Primary | ICD-10-CM

## 2025-01-13 RX ORDER — AZITHROMYCIN 250 MG/1
TABLET, FILM COATED ORAL
Qty: 6 TABLET | Refills: 0 | Status: SHIPPED | OUTPATIENT
Start: 2025-01-13 | End: 2025-01-23

## 2025-01-13 NOTE — TELEPHONE ENCOUNTER
Luis called, Katelyn has had a cough for about 10 days that is not getting any better. The home nurse has been checking her , they are giving her Mucinex and OTC cough syrup. Her cough is productive sometimes and coughing so very hard and it catches her breath. Her 02 is good, ,vitals are good. She is COVID NEGATIVE. She won't leave Miguel go to to ER. Can your order her an antbibiotic?

## 2025-01-13 NOTE — TELEPHONE ENCOUNTER
Patient's daughter, Lashonda, called to inquire why her mother was given an antibiotic without being seen or having a chest x ray given that she is 89 years old. She would like to speak to Dr. Hunter today. She also said they don't see Dr. Hunter, their doctor is Dr. Uday Cherry

## 2025-01-14 NOTE — TELEPHONE ENCOUNTER
I spoke to daughter , Lashonda, (she stated she was recording the phone call)  She asked why the med for mom,  I read the note taken by my girls today to her from the chart. Her sister in law-Luis Milligan- called , mom had been sick for ~10 days, would not get out of house due to dads illness, and I could not see today, she would not get out for chest xray which may have not even been needed. In my medical opinion this respiratory illness has been present for ~ 10 days , cough persisted  without help with otc meds, My opinion was to treat rather than this get worse at her age, ( If she would not go to be evaluated option were to add nothing or treat empirically at her elderly age and length of lingering illness , I chose the later I explained. If she worsened she would have to go to ER,UC or come in. Lashonda then proceeded to tell me , (((( I was not her doctor anymore))))   (which I did not know, I then asked why is family  calling me for treatment then) Apparently Dr GIUSEPPE Mcmillan is caring for her now and I didn't know that I told her, Family never told me this and I've not had a records release to cease her care or notification.   She was on Keflex from prior UTI but this was started on the 26th and done on 1-4-25. If I'd know this I would have told Family to call him. I said Lashonda probably would be best then to call him.  Lashonda was quite upset for Luis calling me and upset with Luis's relayed message.  I stated she should call Luis and Miguel her Brother to communicate on her care, I did not want to be in middle of this turbulence. I did tell Lashonda I was going to call her Brother Miguel Muse. who I know well and discuss his parents care and this call above today,and my treatment ,,, which I did. I told Lashonda,  I wish her parents well and enjoyed caring and knowing them for so many years.

## 2025-02-14 ENCOUNTER — OFFICE VISIT (OUTPATIENT)
Dept: ORTHOPEDIC SURGERY | Age: 89
End: 2025-02-14

## 2025-02-14 VITALS — BODY MASS INDEX: 21.34 KG/M2 | HEIGHT: 64 IN | WEIGHT: 125 LBS

## 2025-02-14 DIAGNOSIS — M17.11 PRIMARY OSTEOARTHRITIS OF RIGHT KNEE: Primary | ICD-10-CM

## 2025-02-14 RX ORDER — TRIAMCINOLONE ACETONIDE 40 MG/ML
80 INJECTION, SUSPENSION INTRA-ARTICULAR; INTRAMUSCULAR ONCE
Status: COMPLETED | OUTPATIENT
Start: 2025-02-14 | End: 2025-02-14

## 2025-02-14 RX ORDER — BUSPIRONE HYDROCHLORIDE 5 MG/1
5 TABLET ORAL 3 TIMES DAILY
COMMUNITY
Start: 2025-01-25

## 2025-02-14 RX ADMIN — TRIAMCINOLONE ACETONIDE 80 MG: 40 INJECTION, SUSPENSION INTRA-ARTICULAR; INTRAMUSCULAR at 11:04

## 2025-02-14 ASSESSMENT — ENCOUNTER SYMPTOMS
SHORTNESS OF BREATH: 0
EYE DISCHARGE: 0
ABDOMINAL DISTENTION: 0
ALLERGIC/IMMUNOLOGIC NEGATIVE: 1

## 2025-02-14 NOTE — PROGRESS NOTES
Fabby Milligan (:  10/12/1932) is a 89 y.o. female,Established patient, here for evaluation of the following chief complaint(s):  Knee Pain (Patient is presenting today for right knee pain. She has complaints of swelling. She described her pain as sore and its rated 8-9/10. She has tried Voltaren gel and otc medications for relief. )      Assessment & Plan   ASSESSMENT/PLAN:  1. Primary osteoarthritis of right knee  -     triamcinolone acetonide (KENALOG-40) injection 80 mg; 80 mg, Intra-artICUlar, ONCE, 1 dose, On 25 at 1130        This is a 89 y.o. year old female with Primary osteoarthritis of right knee [M17.11].  I discussed a variety of treatment options with the patient today including observation, NSAID, low impact exercise, weight loss, physical therapy and injections. I also discussed the risks, benefits, alternatives and subsequent rehab with surgery. The patient would like to proceed with injection.    We discussed the risks and benefits of a corticosteroid injection in the right knee. The patient would like to proceed and consents to the procedure. The patient should let us know if they develop any signs of infection, worsening pain or other problems. I discussed if they have diabetes that they should closely monitor their blood glucose level over the next few days.    The right knee was identified and prepped sterilely. Using 2 cc of 40 mg Kenalog and 4 cc of 0.25% bupivacaine the right knee was injected without issue. This was via a inferomedial portal. The patient tolerated this procedure well and a Band-Aid was placed.      Return if symptoms worsen or fail to improve.         Subjective   SUBJECTIVE/OBJECTIVE:  HPI    89-year-old female here today to discuss her right knee.  This has been ongoing and worsening.  The pain is made worse by walking, standing, sitting, lying down and exercise.  Rates pain 8 out of 10.  Here today to discuss further treatment.  Last visit she had an

## 2025-02-21 RX ORDER — BUSPIRONE HYDROCHLORIDE 5 MG/1
5 TABLET ORAL 3 TIMES DAILY
Qty: 90 TABLET | Refills: 3 | Status: SHIPPED | OUTPATIENT
Start: 2025-02-21

## 2025-03-10 RX ORDER — BUSPIRONE HYDROCHLORIDE 5 MG/1
5 TABLET ORAL 3 TIMES DAILY
Qty: 90 TABLET | Refills: 3 | Status: SHIPPED | OUTPATIENT
Start: 2025-03-10

## 2025-03-22 RX ORDER — AMLODIPINE BESYLATE 5 MG/1
10 TABLET ORAL DAILY
Qty: 180 TABLET | Refills: 3 | Status: SHIPPED | OUTPATIENT
Start: 2025-03-22

## 2025-04-03 DIAGNOSIS — I10 PRIMARY HYPERTENSION: Primary | ICD-10-CM

## 2025-04-03 DIAGNOSIS — R53.83 TIRED: ICD-10-CM

## 2025-04-03 RX ORDER — TRAZODONE HYDROCHLORIDE 50 MG/1
25 TABLET ORAL NIGHTLY
Qty: 45 TABLET | Refills: 1 | Status: SHIPPED | OUTPATIENT
Start: 2025-04-03

## 2025-04-10 ENCOUNTER — HOSPITAL ENCOUNTER (OUTPATIENT)
Age: 89
Discharge: HOME | End: 2025-04-10
Payer: MEDICARE

## 2025-04-10 DIAGNOSIS — R53.83 TIRED: ICD-10-CM

## 2025-04-10 DIAGNOSIS — I10 PRIMARY HYPERTENSION: ICD-10-CM

## 2025-04-10 LAB
ALBUMIN SERPL-MCNC: 3.9 G/DL (ref 3.5–5.2)
ALP SERPL-CCNC: 57 U/L (ref 35–104)
ALT SERPL-CCNC: 13 U/L (ref 0–32)
ANION GAP SERPL CALCULATED.3IONS-SCNC: 14 MMOL/L (ref 7–16)
AST SERPL-CCNC: 13 U/L (ref 0–31)
BASOPHILS # BLD: 0.05 K/UL (ref 0–0.2)
BASOPHILS NFR BLD: 1 % (ref 0–2)
BILIRUB SERPL-MCNC: 0.5 MG/DL (ref 0–1.2)
BUN SERPL-MCNC: 13 MG/DL (ref 6–23)
CALCIUM SERPL-MCNC: 9.5 MG/DL (ref 8.6–10.2)
CHLORIDE SERPL-SCNC: 101 MMOL/L (ref 98–107)
CO2 SERPL-SCNC: 26 MMOL/L (ref 22–29)
CREAT SERPL-MCNC: 0.7 MG/DL (ref 0.5–1)
EOSINOPHIL # BLD: 0.05 K/UL (ref 0.05–0.5)
EOSINOPHILS RELATIVE PERCENT: 1 % (ref 0–6)
ERYTHROCYTE [DISTWIDTH] IN BLOOD BY AUTOMATED COUNT: 13.6 % (ref 11.5–15)
GFR, ESTIMATED: 77 ML/MIN/1.73M2
GLUCOSE SERPL-MCNC: 94 MG/DL (ref 74–99)
HCT VFR BLD AUTO: 42 % (ref 34–48)
HGB BLD-MCNC: 13.9 G/DL (ref 11.5–15.5)
IMM GRANULOCYTES # BLD AUTO: 0.04 K/UL (ref 0–0.58)
IMM GRANULOCYTES NFR BLD: 1 % (ref 0–5)
LYMPHOCYTES NFR BLD: 1.99 K/UL (ref 1.5–4)
LYMPHOCYTES RELATIVE PERCENT: 23 % (ref 20–42)
MCH RBC QN AUTO: 29.5 PG (ref 26–35)
MCHC RBC AUTO-ENTMCNC: 33.1 G/DL (ref 32–34.5)
MCV RBC AUTO: 89.2 FL (ref 80–99.9)
MONOCYTES NFR BLD: 0.77 K/UL (ref 0.1–0.95)
MONOCYTES NFR BLD: 9 % (ref 2–12)
NEUTROPHILS NFR BLD: 66 % (ref 43–80)
NEUTS SEG NFR BLD: 5.59 K/UL (ref 1.8–7.3)
PLATELET # BLD AUTO: 277 K/UL (ref 130–450)
PMV BLD AUTO: 9.2 FL (ref 7–12)
POTASSIUM SERPL-SCNC: 2.9 MMOL/L (ref 3.5–5)
PROT SERPL-MCNC: 6.8 G/DL (ref 6.4–8.3)
RBC # BLD AUTO: 4.71 M/UL (ref 3.5–5.5)
SODIUM SERPL-SCNC: 141 MMOL/L (ref 132–146)
TSH SERPL DL<=0.05 MIU/L-ACNC: 1.03 UIU/ML (ref 0.27–4.2)
WBC OTHER # BLD: 8.5 K/UL (ref 4.5–11.5)

## 2025-04-10 PROCEDURE — 85025 COMPLETE CBC W/AUTO DIFF WBC: CPT

## 2025-04-10 PROCEDURE — 80053 COMPREHEN METABOLIC PANEL: CPT

## 2025-04-10 PROCEDURE — 84443 ASSAY THYROID STIM HORMONE: CPT

## 2025-04-10 PROCEDURE — 36415 COLL VENOUS BLD VENIPUNCTURE: CPT

## 2025-04-21 RX ORDER — BUSPIRONE HYDROCHLORIDE 5 MG/1
10 TABLET ORAL 3 TIMES DAILY
Qty: 180 TABLET | Refills: 3 | Status: SHIPPED | OUTPATIENT
Start: 2025-04-21

## 2025-05-01 ENCOUNTER — TELEPHONE (OUTPATIENT)
Dept: ORTHOPEDIC SURGERY | Age: 89
End: 2025-05-01

## 2025-05-01 NOTE — TELEPHONE ENCOUNTER
Patient's caretaker Vashti calling to speak to the office as patient is in a lot of pain from right hip to right knee. She has an appt on 05/13. Please contact either Vashti or her daughter Beverly [phone].

## 2025-05-02 NOTE — TELEPHONE ENCOUNTER
Left message on daughters voicemail. Office number left for return call. We can move appointment up a week.

## 2025-05-06 ENCOUNTER — OFFICE VISIT (OUTPATIENT)
Dept: ORTHOPEDIC SURGERY | Age: 89
End: 2025-05-06
Payer: MEDICARE

## 2025-05-06 VITALS — BODY MASS INDEX: 21.34 KG/M2 | WEIGHT: 125 LBS | HEIGHT: 64 IN

## 2025-05-06 DIAGNOSIS — M17.11 PRIMARY OSTEOARTHRITIS OF RIGHT KNEE: Primary | ICD-10-CM

## 2025-05-06 PROCEDURE — 20610 DRAIN/INJ JOINT/BURSA W/O US: CPT | Performed by: ORTHOPAEDIC SURGERY

## 2025-05-06 RX ORDER — TRIAMCINOLONE ACETONIDE 40 MG/ML
80 INJECTION, SUSPENSION INTRA-ARTICULAR; INTRAMUSCULAR ONCE
Status: COMPLETED | OUTPATIENT
Start: 2025-05-06 | End: 2025-05-06

## 2025-05-06 RX ADMIN — TRIAMCINOLONE ACETONIDE 80 MG: 40 INJECTION, SUSPENSION INTRA-ARTICULAR; INTRAMUSCULAR at 14:13

## 2025-05-06 ASSESSMENT — ENCOUNTER SYMPTOMS
ALLERGIC/IMMUNOLOGIC NEGATIVE: 1
EYE DISCHARGE: 0
SHORTNESS OF BREATH: 0
ABDOMINAL DISTENTION: 0

## 2025-05-06 NOTE — PROGRESS NOTES
Fabby Milligan (:  10/12/1932) is a 89 y.o. female,Established patient, here for evaluation of the following chief complaint(s):  Knee Pain (Patient is presenting today right knee pain. Pain is radiating up her right thigh. Pain is described as achy.  Last injection 2025 which lasted for about 3 weeks. She has swelling. )      Assessment & Plan   ASSESSMENT/PLAN:  1. Primary osteoarthritis of right knee  -     triamcinolone acetonide (KENALOG-40) injection 80 mg; 80 mg, Intra-artICUlar, ONCE, 1 dose, On 25 at 1430      This is a 89 y.o. year old female with Primary osteoarthritis of right knee [M17.11].  I discussed a variety of treatment options with the patient today including observation, NSAID, low impact exercise, weight loss, physical therapy and injections. I also discussed the risks, benefits, alternatives and subsequent rehab with surgery. The patient would like to proceed with injection.    We discussed the risks and benefits of a corticosteroid injection in the right knee. The patient would like to proceed and consents to the procedure. The patient should let us know if they develop any signs of infection, worsening pain or other problems. I discussed if they have diabetes that they should closely monitor their blood glucose level over the next few days.    The right knee was identified and prepped sterilely. Using 2 cc of 40 mg Kenalog and 4 cc of 0.25% bupivacaine the right knee was injected without issue. This was via a inferomedial portal. The patient tolerated this procedure well and a Band-Aid was placed.      Return in about 4 weeks (around 6/3/2025).         Subjective   SUBJECTIVE/OBJECTIVE:  Knee Pain         89-year-old female here today to discuss her right knee.  Here today for injection.  Has occasional hip pain.    Past Medical History:   Diagnosis Date    Anxiety     Cardiomegaly     GERD (gastroesophageal reflux disease)     Hyperlipidemia     Hypertension     Hypokalemia

## 2025-06-16 RX ORDER — BUSPIRONE HYDROCHLORIDE 5 MG/1
10 TABLET ORAL 3 TIMES DAILY
Qty: 540 TABLET | Refills: 3 | Status: SHIPPED | OUTPATIENT
Start: 2025-06-16

## 2025-06-17 ENCOUNTER — HOSPITAL ENCOUNTER (EMERGENCY)
Age: 89
Discharge: HOME | End: 2025-06-17
Payer: MEDICARE

## 2025-06-17 ENCOUNTER — OFFICE VISIT (OUTPATIENT)
Dept: ORTHOPEDIC SURGERY | Age: 89
End: 2025-06-17
Payer: MEDICARE

## 2025-06-17 VITALS
TEMPERATURE: 98.7 F | BODY MASS INDEX: 20.25 KG/M2 | SYSTOLIC BLOOD PRESSURE: 148 MMHG | RESPIRATION RATE: 18 BRPM | DIASTOLIC BLOOD PRESSURE: 60 MMHG | WEIGHT: 118 LBS | OXYGEN SATURATION: 98 % | HEART RATE: 77 BPM

## 2025-06-17 DIAGNOSIS — M17.11 PRIMARY OSTEOARTHRITIS OF RIGHT KNEE: Primary | ICD-10-CM

## 2025-06-17 DIAGNOSIS — H61.23 BILATERAL IMPACTED CERUMEN: Primary | ICD-10-CM

## 2025-06-17 PROCEDURE — 1123F ACP DISCUSS/DSCN MKR DOCD: CPT | Performed by: ORTHOPAEDIC SURGERY

## 2025-06-17 PROCEDURE — 1125F AMNT PAIN NOTED PAIN PRSNT: CPT | Performed by: ORTHOPAEDIC SURGERY

## 2025-06-17 PROCEDURE — 99213 OFFICE O/P EST LOW 20 MIN: CPT | Performed by: ORTHOPAEDIC SURGERY

## 2025-06-17 PROCEDURE — 1159F MED LIST DOCD IN RCRD: CPT | Performed by: ORTHOPAEDIC SURGERY

## 2025-06-17 PROCEDURE — 69209 REMOVE IMPACTED EAR WAX UNI: CPT

## 2025-06-17 PROCEDURE — G8427 DOCREV CUR MEDS BY ELIG CLIN: HCPCS | Performed by: ORTHOPAEDIC SURGERY

## 2025-06-17 PROCEDURE — 1036F TOBACCO NON-USER: CPT | Performed by: ORTHOPAEDIC SURGERY

## 2025-06-17 PROCEDURE — 1090F PRES/ABSN URINE INCON ASSESS: CPT | Performed by: ORTHOPAEDIC SURGERY

## 2025-06-17 PROCEDURE — 99211 OFF/OP EST MAY X REQ PHY/QHP: CPT

## 2025-06-17 PROCEDURE — G8420 CALC BMI NORM PARAMETERS: HCPCS | Performed by: ORTHOPAEDIC SURGERY

## 2025-06-17 ASSESSMENT — PAIN SCALES - GENERAL: PAINLEVEL_OUTOF10: 0

## 2025-06-17 ASSESSMENT — ENCOUNTER SYMPTOMS
SHORTNESS OF BREATH: 0
ABDOMINAL DISTENTION: 0
ALLERGIC/IMMUNOLOGIC NEGATIVE: 1
EYE DISCHARGE: 0

## 2025-06-17 ASSESSMENT — PAIN - FUNCTIONAL ASSESSMENT: PAIN_FUNCTIONAL_ASSESSMENT: 0-10

## 2025-06-17 NOTE — PROGRESS NOTES
Fabby Milligan (:  10/12/1932) is a 89 y.o. female,Established patient, here for evaluation of the following chief complaint(s):  Knee Pain (Patient presents today for follow up on right knee. Patient states she does have pain still. Patient denies taking anything for pain. Patient states she has gotten cortisone injections.)      Assessment & Plan   ASSESSMENT/PLAN:  1. Primary osteoarthritis of right knee      This is a 89 y.o. year old female with Primary osteoarthritis of right knee [M17.11].  I discussed a variety of treatment options with the patient today including observation, NSAID, low impact exercise, weight loss, physical therapy and injections. I also discussed the risks, benefits, alternatives and subsequent rehab with surgery. The patient would like to proceed with OTC NSAIDs.      Return if symptoms worsen or fail to improve.         Subjective   SUBJECTIVE/OBJECTIVE:  Knee Pain         89-year-old female here today to discuss her right knee.  She had an injection which helped.  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  Has pain after.    Past Medical History:   Diagnosis Date    Anxiety     Cardiomegaly     GERD (gastroesophageal reflux disease)     Hyperlipidemia     Hypertension     Hypokalemia     Metabolic encephalopathy     Primary degenerative dementia of Alzheimer type (HCC)      Past Surgical History:   Procedure Laterality Date    APPENDECTOMY      CHOLECYSTECTOMY      COLONOSCOPY  13    HYSTERECTOMY (CERVIX STATUS UNKNOWN)        History reviewed. No pertinent family history.   Social History     Tobacco Use    Smoking status: Former     Current packs/day: 0.00     Average packs/day: 0.3 packs/day for 4.0 years (1.0 ttl pk-yrs)     Types: Cigarettes     Start date:      Quit date: 1960     Years since quittin.5    Smokeless tobacco: Never   Substance Use Topics    Alcohol use: Yes     Comment: social    Drug use: No        Review of Systems

## 2025-06-17 NOTE — ED NOTES
Bilateral ears flushed for large amount of ear wax. PA notified.      Naty Kathleen LPN  06/17/25 3019

## 2025-06-17 NOTE — DISCHARGE INSTR - COC
Concerns:[id number]}    Impairments/Disabilities:      { TANYA Impairments/Disabilities:311847358}    Nutrition Therapy:  Current Nutrition Therapy:   { TANYA Diet List:558278795}    Routes of Feeding: {[facility] DME Other Feedings:858215038}  Liquids: {Slp liquid thickness:72745}  Daily Fluid Restriction: {P DME Yes amt example:773083043}  Last Modified Barium Swallow with Video (Video Swallowing Test): {Done Not Done Date:}    Treatments at the Time of Hospital Discharge:   Respiratory Treatments: ***  Oxygen Therapy:  {Therapy; copd oxygen:07215}  Ventilator:    { CC Vent List:492567107}    Rehab Therapies: {THERAPEUTIC INTERVENTION:5887842588}  Weight Bearing Status/Restrictions: {[facility] Weight Bearin}  Other Medical Equipment (for information only, NOT a DME order):  {EQUIPMENT:889523000}  Other Treatments: ***    Patient's personal belongings (please select all that are sent with patient):  {[facility] DME Belongings:035511111}    RN SIGNATURE:  {Esignature:662575651}    CASE MANAGEMENT/SOCIAL WORK SECTION    Inpatient Status Date: ***    Readmission Risk Assessment Score:  [facility] RISK OF UNPLANNED READMISSION 2.0             0 Total Score        Discharging to Facility/ Agency   Name:   Address:  Phone:  Fax:    Dialysis Facility (if applicable)   Name:  Address:  Dialysis Schedule:  Phone:  Fax:    / signature: {Esignature:395659513}    PHYSICIAN SECTION    Prognosis: {Prognosis:1915497621}    Condition at Discharge: { Patient Condition:009001542}    Rehab Potential (if transferring to Rehab): {Prognosis:9739705139}    Recommended Labs or Other Treatments After Discharge: ***    Physician Certification: I certify the above information and transfer of Fabby Milligan  is necessary for the continuing treatment of the diagnosis listed and that she requires {Admit to Appropriate Level of Care:45078} for {GREATER/LESS:563856389} 30 days.     Update Admission H&P: {[facility] DME

## 2025-06-17 NOTE — DISCHARGE INSTRUCTIONS
Wait 5 days then may try over the counter DEBROX EAR WAX drops - use as directed  May consider calling [facility] CALL [facility]NT for further treatment.

## 2025-06-17 NOTE — ED PROVIDER NOTES
[facility] URGENT CARE     NAME: Fabby Milligan  :  10/12/1932  MRN:  [id number]  Date of evaluation: 2025  Provider: Flavio Dalton PA-C  PCP: Uday Cherry MD  Note Started : 2:47 PM EDT 25    Chief Complaint: Ear Fullness      This is a 89-year-old female who presents to urgent care with her daughter.  Daughter states that her mother has been having some fullness sensation to both ears and some decreased hearing and they think there could be some wax in both ears.  The patient denies any pain.  No fevers or chills.  No bleeding or discharge.  On first contact patient she appears to be in no acute distress.        Review of Systems  Pertinent positives and negatives are stated within HPI, all other systems reviewed and are negative.     Allergies: Patient has no known allergies.     --------------------------------------------- PAST HISTORY ---------------------------------------------  Past Medical History:  has a past medical history of Anxiety, Cardiomegaly, GERD (gastroesophageal reflux disease), Hyperlipidemia, Hypertension, Hypokalemia, Metabolic encephalopathy, and Primary degenerative dementia of Alzheimer type (HCC).    Past Surgical History:  has a past surgical history that includes Cholecystectomy; Appendectomy; Hysterectomy; and Colonoscopy (13).    Social History:  reports that she quit smoking about 65 years ago. Her smoking use included cigarettes. She started smoking about 69 years ago. She has a 1 pack-year smoking history. She has never used smokeless tobacco. She reports current alcohol use. She reports that she does not use drugs.    Family History: family history is not on file.     The patient’s home medications have been reviewed.    The nursing notes within the ED encounter have been reviewed.     ------------------------------------------------SCREENINGS----------------------------------------------                        JANNETTEWA Assessment  BP: (!)

## 2025-06-20 ENCOUNTER — TELEPHONE (OUTPATIENT)
Dept: ADMINISTRATIVE | Age: 89
End: 2025-06-20

## 2025-06-20 NOTE — TELEPHONE ENCOUNTER
Patient seen at [facility] [place] on 6/17,  does not want to go to [place] to follow on call Dr. Ramos. Would like to stay in [place] . Please advise if office can see patient for f/u dx Bilateral impacted cerumen

## 2025-08-11 VITALS
TEMPERATURE: 97.5 F | SYSTOLIC BLOOD PRESSURE: 169 MMHG | OXYGEN SATURATION: 99 % | HEART RATE: 92 BPM | BODY MASS INDEX: 20.14 KG/M2 | RESPIRATION RATE: 16 BRPM | DIASTOLIC BLOOD PRESSURE: 72 MMHG | WEIGHT: 118 LBS | HEIGHT: 64 IN

## 2025-08-11 PROCEDURE — 99284 EMERGENCY DEPT VISIT MOD MDM: CPT

## 2025-08-11 PROCEDURE — 90714 TD VACC NO PRESV 7 YRS+ IM: CPT | Performed by: PHYSICIAN ASSISTANT

## 2025-08-11 PROCEDURE — 6360000002 HC RX W HCPCS: Performed by: PHYSICIAN ASSISTANT

## 2025-08-11 PROCEDURE — 90471 IMMUNIZATION ADMIN: CPT | Performed by: PHYSICIAN ASSISTANT

## 2025-08-11 PROCEDURE — 6370000000 HC RX 637 (ALT 250 FOR IP): Performed by: PHYSICIAN ASSISTANT

## 2025-08-11 RX ORDER — ACETAMINOPHEN 325 MG/1
650 TABLET ORAL ONCE
Status: COMPLETED | OUTPATIENT
Start: 2025-08-11 | End: 2025-08-11

## 2025-08-11 RX ADMIN — CLOSTRIDIUM TETANI TOXOID ANTIGEN (FORMALDEHYDE INACTIVATED) AND CORYNEBACTERIUM DIPHTHERIAE TOXOID ANTIGEN (FORMALDEHYDE INACTIVATED) 0.5 ML: 5; 2 INJECTION, SUSPENSION INTRAMUSCULAR at 22:41

## 2025-08-11 RX ADMIN — ACETAMINOPHEN 650 MG: 325 TABLET ORAL at 22:41

## 2025-08-11 ASSESSMENT — PAIN - FUNCTIONAL ASSESSMENT
PAIN_FUNCTIONAL_ASSESSMENT: 0-10
PAIN_FUNCTIONAL_ASSESSMENT: 0-10

## 2025-08-11 ASSESSMENT — LIFESTYLE VARIABLES: HOW OFTEN DO YOU HAVE A DRINK CONTAINING ALCOHOL: NEVER

## 2025-08-11 ASSESSMENT — PAIN SCALES - GENERAL
PAINLEVEL_OUTOF10: 5
PAINLEVEL_OUTOF10: 5

## 2025-08-11 ASSESSMENT — PAIN DESCRIPTION - DESCRIPTORS: DESCRIPTORS: SORE;ACHING;TENDER

## 2025-08-11 ASSESSMENT — PAIN DESCRIPTION - ORIENTATION: ORIENTATION: RIGHT

## 2025-08-12 ENCOUNTER — APPOINTMENT (OUTPATIENT)
Dept: GENERAL RADIOLOGY | Age: 89
End: 2025-08-12
Payer: MEDICARE

## 2025-08-12 ENCOUNTER — HOSPITAL ENCOUNTER (EMERGENCY)
Age: 89
Discharge: HOME | End: 2025-08-12
Payer: MEDICARE

## 2025-08-12 DIAGNOSIS — S63.501A SPRAIN OF RIGHT WRIST, INITIAL ENCOUNTER: ICD-10-CM

## 2025-08-12 DIAGNOSIS — S80.02XA CONTUSION OF LEFT KNEE, INITIAL ENCOUNTER: ICD-10-CM

## 2025-08-12 DIAGNOSIS — S81.812A NONINFECTED SKIN TEAR OF LEFT LEG, INITIAL ENCOUNTER: ICD-10-CM

## 2025-08-12 DIAGNOSIS — M25.461 EFFUSION OF RIGHT KNEE: Primary | ICD-10-CM

## 2025-08-12 DIAGNOSIS — S70.01XA CONTUSION OF RIGHT HIP, INITIAL ENCOUNTER: ICD-10-CM

## 2025-08-12 PROCEDURE — 73110 X-RAY EXAM OF WRIST: CPT

## 2025-08-12 PROCEDURE — 73562 X-RAY EXAM OF KNEE 3: CPT

## 2025-08-12 PROCEDURE — 73502 X-RAY EXAM HIP UNI 2-3 VIEWS: CPT

## 2025-08-12 PROCEDURE — 73030 X-RAY EXAM OF SHOULDER: CPT
